# Patient Record
Sex: FEMALE | Race: WHITE | Employment: OTHER | URBAN - METROPOLITAN AREA
[De-identification: names, ages, dates, MRNs, and addresses within clinical notes are randomized per-mention and may not be internally consistent; named-entity substitution may affect disease eponyms.]

---

## 2017-02-07 ENCOUNTER — OFFICE VISIT (OUTPATIENT)
Dept: FAMILY MEDICINE CLINIC | Age: 57
End: 2017-02-07

## 2017-02-07 VITALS
SYSTOLIC BLOOD PRESSURE: 133 MMHG | BODY MASS INDEX: 31.7 KG/M2 | RESPIRATION RATE: 20 BRPM | HEIGHT: 65 IN | HEART RATE: 92 BPM | TEMPERATURE: 97.4 F | DIASTOLIC BLOOD PRESSURE: 89 MMHG | WEIGHT: 190.25 LBS

## 2017-02-07 DIAGNOSIS — E78.2 MIXED HYPERLIPIDEMIA: ICD-10-CM

## 2017-02-07 DIAGNOSIS — Z51.81 MEDICATION MONITORING ENCOUNTER: ICD-10-CM

## 2017-02-07 DIAGNOSIS — R73.03 PREDIABETES: ICD-10-CM

## 2017-02-07 DIAGNOSIS — I10 ESSENTIAL HYPERTENSION: ICD-10-CM

## 2017-02-07 DIAGNOSIS — J02.9 SORE THROAT: ICD-10-CM

## 2017-02-07 DIAGNOSIS — R73.01 ELEVATED FASTING BLOOD SUGAR: Primary | ICD-10-CM

## 2017-02-07 LAB
GLUCOSE POC: 129 MG/DL
HBA1C MFR BLD HPLC: 5.8 %
S PYO AG THROAT QL: NEGATIVE
VALID INTERNAL CONTROL?: YES

## 2017-02-07 NOTE — MR AVS SNAPSHOT
Visit Information Date & Time Provider Department Dept. Phone Encounter #  
 2/7/2017  1:15 PM Hammad Ortiz MD [facility] [phone] [id number] Follow-up Instructions Return in about 3 months (around 5/7/2017). Upcoming Health Maintenance Date Due FOBT Q 1 YEAR AGE 50-75 11/14/2015 PAP AKA CERVICAL CYTOLOGY 11/18/2017 BREAST CANCER SCRN MAMMOGRAM 10/18/2018 DTaP/Tdap/Td series (2 - Td) 3/15/2026 Allergies as of 2/7/2017  Review Complete On: 2/7/2017 By: Hammad Ortiz MD  
 No Known Allergies Current Immunizations  Reviewed on 3/15/2016 Name Date  
 TD Vaccine 7/19/2010 Tdap 3/15/2016 Not reviewed this visit You Were Diagnosed With   
  
 Codes Comments Elevated fasting blood sugar    -  Primary ICD-10-CM: R73.01 
ICD-9-CM: 790.21 Prediabetes     ICD-10-CM: R73.03 
ICD-9-CM: 790.29 Essential hypertension     ICD-10-CM: I10 
ICD-9-CM: 401.9 Mixed hyperlipidemia     ICD-10-CM: E78.2 ICD-9-CM: 272.2 Sore throat     ICD-10-CM: J02.9 ICD-9-CM: 363 Medication monitoring encounter     ICD-10-CM: Z51.81 
ICD-9-CM: V58.83 Vitals BP Pulse Temp Resp Height(growth percentile) Weight(growth percentile) 133/89 (BP 1 Location: Right arm, BP Patient Position: Sitting) 92 97.4 °F (36.3 °C) (Oral) 20 5' 4.5\" (1.638 m) 190 lb 4 oz (86.3 kg) BMI OB Status Smoking Status 32.15 kg/m2 Hysterectomy Former Smoker BMI and BSA Data Body Mass Index Body Surface Area  
 32.15 kg/m 2 1.98 m 2 Preferred Pharmacy Pharmacy Name Phone [id number], [address] [place] [address] Your Updated Medication List  
  
   
This list is accurate as of: 2/7/17  1:51 PM.  Always use your most recent med list.  
  
  
  
  
 amitriptyline 10 mg tablet Commonly known as:  ELAVIL TAKE 1 TABLET NIGHTLY  
  
 atorvastatin 40 mg tablet Commonly known as:  LIPITOR  
TAKE 1 TABLET DAILY CeleBREX 100 mg capsule Generic drug:  celecoxib Take  by mouth daily. As needed FISH OIL 1,000 mg Cap Generic drug:  omega-3 fatty acids-vitamin e Take 1 Cap by mouth daily. 1200mg  
  
 hydroCHLOROthiazide 25 mg tablet Commonly known as:  HYDRODIURIL  
TAKE 1 TABLET DAILY ZyrTEC 10 mg tablet Generic drug:  cetirizine Take  by mouth daily. We Performed the Following AMB POC GLUCOSE, QUANTITATIVE, BLOOD [96414 CPT(R)] AMB POC HEMOGLOBIN A1C [81307 CPT(R)] AMB POC RAPID STREP A [24377 CPT(R)] Follow-up Instructions Return in about 3 months (around 5/7/2017). To-Do List   
 05/07/2017 Lab:  CBC WITH AUTOMATED DIFF   
  
 05/07/2017 Lab:  HEMOGLOBIN A1C W/O EAG   
  
 05/07/2017 Lab:  LIPID PANEL   
  
 05/07/2017 Lab:  METABOLIC PANEL, COMPREHENSIVE   
  
 05/07/2017 Lab:  TSH 3RD GENERATION Patient Instructions Please contact our office if you have any questions about your visit today. Introducing [facility]! Dear Mahesh Stewart: Thank you for requesting a Trada account. Our records indicate that you already have an active Trada account. You can access your account anytime at [email or web address] Story of [email or web address] Did you know that you can access your hospital and ER discharge instructions at any time in Trada? You can also review all of your test results from your hospital stay or ER visit. Additional Information If you have questions, please visit the Frequently Asked Questions section of the Trada website at [email or web address] Remember, Trada is NOT to be used for urgent needs. For medical emergencies, dial 911. Now available from your iPhone and Android! Please provide this summary of care documentation to your next provider. Your primary care clinician is listed as GAURI SMITH. If you have any questions after today's visit, please call [phone].

## 2017-02-07 NOTE — PROGRESS NOTES
HISTORY OF PRESENT ILLNESS  Matt Martínez is a 49 y.o. female. Chief Complaint   Patient presents with    Cholesterol Problem chronic problem, stable      high chol    Hypertension chronic problem, stable     Allergic Rhinitis    Arthritis     Prediabetes chronic problem, stable no meds on diet for this  complains of sore throat, no f/c worried about strep  HPI  Past Medical History   Diagnosis Date    Allergic rhinitis     Arthritis     Hyperlipidemia     Hypertension     IBS (irritable bowel syndrome)     Migraine      Current Outpatient Prescriptions   Medication Sig Dispense Refill    amitriptyline (ELAVIL) 10 mg tablet TAKE 1 TABLET NIGHTLY 90 Tab 3    atorvastatin (LIPITOR) 40 mg tablet TAKE 1 TABLET DAILY 90 Tab 2    hydrochlorothiazide (HYDRODIURIL) 25 mg tablet TAKE 1 TABLET DAILY 90 Tab 2    cetirizine (ZYRTEC) 10 mg tablet Take  by mouth daily.  celecoxib (CELEBREX) 100 mg capsule Take  by mouth daily. As needed      omega-3 fatty acids-vitamin e (FISH OIL) 1,000 mg Cap Take 1 Cap by mouth daily. 1200mg       No Known Allergies    Review of Systems   Constitutional: Negative for chills and fever. HENT: Positive for sore throat. Neurological: Negative for dizziness and headaches. Visit Vitals    /89 (BP 1 Location: Right arm, BP Patient Position: Sitting)    Pulse 92    Temp 97.4 °F (36.3 °C) (Oral)    Resp 20    Ht 5' 4.5\" (1.638 m)    Wt 190 lb 4 oz (86.3 kg)    BMI 32.15 kg/m2     Physical Exam Nursing note and vitals reviewed. Constitutional: She is oriented to person, place, and time. She appears well-developed and well-nourished. No distress. HENT:   Mouth/Throat: Oropharynx is mildly injected, small exudate right and moist.   Neck: No JVD present. No thyromegaly present. Cardiovascular: Normal rate, regular rhythm and normal heart sounds. Pulmonary/Chest: Effort normal and breath sounds normal. No respiratory distress. She has no wheezes.  She has no rales.   Musculoskeletal: She exhibits no edema. Lymphadenopathy:     She has no cervical adenopathy. Neurological: She is alert and oriented to person, place, and time. Coordination normal.   Psychiatric: She has a normal mood and affect. Her behavior is normal.     ,  Results for orders placed or performed in visit on 02/07/17   AMB POC HEMOGLOBIN A1C   Result Value Ref Range    Hemoglobin A1c (POC) 5.8 %   AMB POC GLUCOSE, QUANTITATIVE, BLOOD   Result Value Ref Range    Glucose  mg/dL   AMB POC RAPID STREP A   Result Value Ref Range    VALID INTERNAL CONTROL POC Yes     Group A Strep Ag Negative Negative       ASSESSMENT and PLAN    ICD-10-CM ICD-9-CM    1. Elevated fasting blood sugar Stable, continue current care. R73.01 790.21 AMB POC HEMOGLOBIN A1C      AMB POC GLUCOSE, QUANTITATIVE, BLOOD      METABOLIC PANEL, COMPREHENSIVE      CBC WITH AUTOMATED DIFF      TSH 3RD GENERATION      HEMOGLOBIN A1C W/O EAG   2. Prediabetes Stable, continue current care. X71.15 170.75 METABOLIC PANEL, COMPREHENSIVE      CBC WITH AUTOMATED DIFF      TSH 3RD GENERATION      HEMOGLOBIN A1C W/O EAG   3. Essential hypertension stable continue current medications  T82 242.1 METABOLIC PANEL, COMPREHENSIVE      CBC WITH AUTOMATED DIFF      TSH 3RD GENERATION      HEMOGLOBIN A1C W/O EAG   4. Mixed hyperlipidemia Check labs on follow up   V86.2 675.1 METABOLIC PANEL, COMPREHENSIVE      LIPID PANEL      CBC WITH AUTOMATED DIFF      TSH 3RD GENERATION      HEMOGLOBIN A1C W/O EAG   5. Sore throat J02.9 462    6.  Medication monitoring encounter G17.65 D78.96 METABOLIC PANEL, COMPREHENSIVE      LIPID PANEL      CBC WITH AUTOMATED DIFF      TSH 3RD GENERATION      HEMOGLOBIN A1C W/O EAG      AMB POC RAPID STREP A

## 2017-02-07 NOTE — PROGRESS NOTES
Chief Complaint   Patient presents with    Cholesterol Problem     high chol    Hypertension    Allergic Rhinitis    Arthritis       Health Maintenance reviewed     1. Have you been to the ER, urgent care clinic since your last visit? Hospitalized since your last visit? No    2. Have you seen or consulted any other health care providers outside of the [address] [place] [address] since your last visit? Include any pap smears or colon screening.  No

## 2017-02-17 RX ORDER — HYDROCHLOROTHIAZIDE 25 MG/1
TABLET ORAL
Qty: 90 TAB | Refills: 1 | Status: SHIPPED | OUTPATIENT
Start: 2017-02-17 | End: 2017-08-16 | Stop reason: SDUPTHER

## 2017-02-17 RX ORDER — ATORVASTATIN CALCIUM 40 MG/1
TABLET, FILM COATED ORAL
Qty: 90 TAB | Refills: 1 | Status: SHIPPED | OUTPATIENT
Start: 2017-02-17 | End: 2017-08-16 | Stop reason: SDUPTHER

## 2017-06-07 ENCOUNTER — HOSPITAL ENCOUNTER (OUTPATIENT)
Dept: LAB | Age: 57
Discharge: HOME | End: 2017-06-07
Payer: OTHER GOVERNMENT

## 2017-06-07 LAB
ALBUMIN SERPL BCP-MCNC: 3.8 G/DL (ref 3.4–5)
ALBUMIN/GLOB SERPL: 1.2 {RATIO} (ref 0.8–1.7)
ALP SERPL-CCNC: 113 U/L (ref 45–117)
ALT SERPL-CCNC: 30 U/L (ref 13–56)
ANION GAP BLD CALC-SCNC: 9 MMOL/L (ref 3–18)
AST SERPL W P-5'-P-CCNC: 15 U/L (ref 15–37)
BASOPHILS # BLD AUTO: 0 K/UL (ref 0–0.1)
BASOPHILS # BLD: 1 % (ref 0–2)
BILIRUB SERPL-MCNC: 0.6 MG/DL (ref 0.2–1)
BUN SERPL-MCNC: 15 MG/DL (ref 7–18)
BUN/CREAT SERPL: 24 (ref 12–20)
CALCIUM SERPL-MCNC: 9 MG/DL (ref 8.5–10.1)
CHLORIDE SERPL-SCNC: 101 MMOL/L (ref 100–108)
CHOLEST SERPL-MCNC: 160 MG/DL
CO2 SERPL-SCNC: 30 MMOL/L (ref 21–32)
CREAT SERPL-MCNC: 0.62 MG/DL (ref 0.6–1.3)
DIFFERENTIAL METHOD BLD: NORMAL
EOSINOPHIL # BLD: 0.1 K/UL (ref 0–0.4)
EOSINOPHIL NFR BLD: 2 % (ref 0–5)
ERYTHROCYTE [DISTWIDTH] IN BLOOD BY AUTOMATED COUNT: 13.2 % (ref 11.6–14.5)
GLOBULIN SER CALC-MCNC: 3.3 G/DL (ref 2–4)
GLUCOSE SERPL-MCNC: 103 MG/DL (ref 74–99)
HBA1C MFR BLD: 5.9 % (ref 4.2–5.6)
HCT VFR BLD AUTO: 39.1 % (ref 35–45)
HDLC SERPL-MCNC: 49 MG/DL (ref 40–60)
HDLC SERPL: 3.3 {RATIO} (ref 0–5)
HGB BLD-MCNC: 13.1 G/DL (ref 12–16)
LDLC SERPL CALC-MCNC: 79.8 MG/DL (ref 0–100)
LIPID PROFILE,FLP: ABNORMAL
LYMPHOCYTES # BLD AUTO: 33 % (ref 21–52)
LYMPHOCYTES # BLD: 2 K/UL (ref 0.9–3.6)
MCH RBC QN AUTO: 29.5 PG (ref 24–34)
MCHC RBC AUTO-ENTMCNC: 33.5 G/DL (ref 31–37)
MCV RBC AUTO: 88.1 FL (ref 74–97)
MONOCYTES # BLD: 0.4 K/UL (ref 0.05–1.2)
MONOCYTES NFR BLD AUTO: 7 % (ref 3–10)
NEUTS SEG # BLD: 3.5 K/UL (ref 1.8–8)
NEUTS SEG NFR BLD AUTO: 57 % (ref 40–73)
PLATELET # BLD AUTO: 310 K/UL (ref 135–420)
PMV BLD AUTO: 10.4 FL (ref 9.2–11.8)
POTASSIUM SERPL-SCNC: 4 MMOL/L (ref 3.5–5.5)
PROT SERPL-MCNC: 7.1 G/DL (ref 6.4–8.2)
RBC # BLD AUTO: 4.44 M/UL (ref 4.2–5.3)
SODIUM SERPL-SCNC: 140 MMOL/L (ref 136–145)
TRIGL SERPL-MCNC: 156 MG/DL (ref ?–150)
TSH SERPL DL<=0.05 MIU/L-ACNC: 1.52 UIU/ML (ref 0.36–3.74)
VLDLC SERPL CALC-MCNC: 31.2 MG/DL
WBC # BLD AUTO: 6.1 K/UL (ref 4.6–13.2)

## 2017-06-07 PROCEDURE — 84443 ASSAY THYROID STIM HORMONE: CPT | Performed by: FAMILY MEDICINE

## 2017-06-07 PROCEDURE — 80053 COMPREHEN METABOLIC PANEL: CPT | Performed by: FAMILY MEDICINE

## 2017-06-07 PROCEDURE — 83036 HEMOGLOBIN GLYCOSYLATED A1C: CPT | Performed by: FAMILY MEDICINE

## 2017-06-07 PROCEDURE — 85025 COMPLETE CBC W/AUTO DIFF WBC: CPT | Performed by: FAMILY MEDICINE

## 2017-06-07 PROCEDURE — 36415 COLL VENOUS BLD VENIPUNCTURE: CPT | Performed by: FAMILY MEDICINE

## 2017-06-07 PROCEDURE — 80061 LIPID PANEL: CPT | Performed by: FAMILY MEDICINE

## 2017-06-12 ENCOUNTER — OFFICE VISIT (OUTPATIENT)
Dept: FAMILY MEDICINE CLINIC | Age: 57
End: 2017-06-12

## 2017-06-12 ENCOUNTER — DOCUMENTATION ONLY (OUTPATIENT)
Dept: FAMILY MEDICINE CLINIC | Age: 57
End: 2017-06-12

## 2017-06-12 VITALS — BODY MASS INDEX: 31.65 KG/M2 | WEIGHT: 190 LBS | HEIGHT: 65 IN

## 2017-06-12 DIAGNOSIS — L29.9 PRURITIC DERMATITIS: ICD-10-CM

## 2017-06-12 DIAGNOSIS — L23.7 POISON IVY DERMATITIS: Primary | ICD-10-CM

## 2017-06-12 RX ORDER — HYDROXYZINE PAMOATE 25 MG/1
25 CAPSULE ORAL
Qty: 30 CAP | Refills: 0 | Status: SHIPPED | OUTPATIENT
Start: 2017-06-12 | End: 2017-06-26

## 2017-06-12 RX ORDER — METHYLPREDNISOLONE 4 MG/1
TABLET ORAL
Qty: 1 DOSE PACK | Refills: 0 | Status: SHIPPED | OUTPATIENT
Start: 2017-06-12 | End: 2017-06-29 | Stop reason: ALTCHOICE

## 2017-06-12 RX ORDER — TRIAMCINOLONE ACETONIDE 40 MG/ML
60 INJECTION, SUSPENSION INTRA-ARTICULAR; INTRAMUSCULAR ONCE
Qty: 1 ML | Refills: 0
Start: 2017-06-12 | End: 2017-06-12

## 2017-06-12 RX ORDER — HYDROXYZINE PAMOATE 25 MG/1
25 CAPSULE ORAL
Qty: 30 CAP | Refills: 0 | Status: SHIPPED | OUTPATIENT
Start: 2017-06-12 | End: 2017-06-12 | Stop reason: SDUPTHER

## 2017-06-12 RX ORDER — METHYLPREDNISOLONE 4 MG/1
TABLET ORAL
Qty: 1 DOSE PACK | Refills: 0 | Status: SHIPPED | OUTPATIENT
Start: 2017-06-12 | End: 2017-06-12 | Stop reason: SDUPTHER

## 2017-06-12 NOTE — PATIENT INSTRUCTIONS
Please contact our office if you have any questions about your visit today. Poison [place], [place], and [place]: Care Instructions  Your Care Instructions    Poison ivy, poison oak, and poison sumac are plants that can cause a skin rash upon contact. The red, itchy rash often shows up in lines or streaks and may cause fluid-filled blisters or large, raised hives. The rash is caused by an allergic reaction to an oil in poison ivy, oak, and sumac. The rash may occur when you touch the plant or when you touch clothing, pet fur, sporting gear, gardening tools, or other objects that have come in contact with one of these plants. You cannot catch or spread the rash, even if you touch it or the blister fluid, because the plant oil will already have been absorbed or washed off the skin. The rash may seem to be spreading, but either it is still developing from earlier contact or you have touched something that still has the plant oil on it. Follow-up care is a key part of your treatment and safety. Be sure to make and go to all appointments, and call your doctor if you are having problems. It's also a good idea to know your test results and keep a list of the medicines you take. How can you care for yourself at home? · If your doctor prescribed a cream, use it as directed. If your doctor prescribed medicine, take it exactly as prescribed. Call your doctor if you think you are having a problem with your medicine. · Use cold, wet cloths to reduce itching. · Keep cool, and stay out of the sun. · Leave the rash open to the air. · Wash all clothing or other things that may have come in contact with the plant oil. · Avoid most lotions and ointments until the rash heals. Calamine lotion may help relieve symptoms of a plant rash. Use it 3 or 4 times a day.   To prevent poison ivy exposure  If you know that you will be near poison ivy, oak, or sumac, you can try these options:  · Use a product designed to help prevent plant oil from getting on the skin. These products, such as Ivy X Pre-Contact Skin Solution, come in lotions, sprays, or towelettes. You put the product on your skin right before you go outdoors. · If you did not use a preventive product and you have had contact with plant oil, clean it off your skin as soon as possible. Use a product such as Tecnu Original Outdoor Skin Cleanser. These products can also be used to clean plant oil from clothing or tools. When should you call for help? Call your doctor now or seek immediate medical care if:  · Your rash gets worse, and you start to feel bad and have a fever, a stiff neck, nausea, and vomiting. · You have signs of infection, such as:  ¨ Increased pain, swelling, warmth, or redness. ¨ Red streaks leading from the rash. ¨ Pus draining from the rash. ¨ A fever. Watch closely for changes in your health, and be sure to contact your doctor if:  · You have new blisters or bruises, or the rash spreads and looks like a sunburn. · The rash gets worse, or it comes back after nearly disappearing. · You think a medicine you are using is making your rash worse. · Your rash does not clear up after 1 to 2 weeks of home treatment. · You have joint aches or body aches with your rash. Where can you learn more? Go to http://edi-oneil.info/. Enter D705 in the search box to learn more about \"Poison [place], [place], and [place]: Care Instructions. \"  Current as of: October 13, 2016  Content Version: 11.2  © 5343-2913 [employer]. Care instructions adapted under license by [employer] (which disclaims liability or warranty for this information). If you have questions about a medical condition or this instruction, always ask your healthcare professional. [address] any warranty or liability for your use of this information.

## 2017-06-12 NOTE — PROGRESS NOTES
HISTORY OF PRESENT ILLNESS  Alistair Forman is a 49 y.o. female. Chief Complaint   Patient presents with    Poison Ivy/Poison Oak/Poison [place] Exposure     pt has poison ivy on arms, legs, body     Started 10 days ago rapidly spreading and progressing, has been using benadryl , calamine, otc helps some only temporarily  No f/c   HPI  Past Medical History:   Diagnosis Date    Allergic rhinitis     Arthritis     Hyperlipidemia     Hypertension     IBS (irritable bowel syndrome)     Migraine      Current Outpatient Prescriptions   Medication Sig Dispense Refill    hydroCHLOROthiazide (HYDRODIURIL) 25 mg tablet TAKE 1 TABLET DAILY 90 Tab 1    atorvastatin (LIPITOR) 40 mg tablet TAKE 1 TABLET DAILY 90 Tab 1    amitriptyline (ELAVIL) 10 mg tablet TAKE 1 TABLET NIGHTLY 90 Tab 3    cetirizine (ZYRTEC) 10 mg tablet Take  by mouth daily.  celecoxib (CELEBREX) 100 mg capsule Take  by mouth daily. As needed      omega-3 fatty acids-vitamin e (FISH OIL) 1,000 mg Cap Take 1 Cap by mouth daily. 1200mg       No Known Allergies    Review of Systems   Constitutional: Negative for chills and fever. Skin: Positive for itching and rash. Visit Vitals    Ht 5' 4.5\" (1.638 m)    Wt 190 lb (86.2 kg)    BMI 32.11 kg/m2       Physical Exam   Constitutional: She appears well-developed and well-nourished. She appears distressed. Cardiovascular: Normal rate, regular rhythm and normal heart sounds. Pulmonary/Chest: Effort normal and breath sounds normal. No respiratory distress. She has no wheezes. She has no rales. Musculoskeletal: She exhibits edema. Skin: Rash noted. There is erythema. Splotchy vesicles on erythematous base patches diffusely     Psychiatric: She has a normal mood and affect. Her behavior is normal.   Nursing note and vitals reviewed. ASSESSMENT and PLAN    ICD-10-CM ICD-9-CM    1.  Poison ivy dermatitis L23.7 692.6 TRIAMCINOLONE ACETONIDE INJ      triamcinolone acetonide (KENALOG) 40 mg/mL injection      hydrOXYzine pamoate (VISTARIL) 25 mg capsule      methylPREDNISolone (MEDROL DOSEPACK) 4 mg tablet      DISCONTINUED: methylPREDNISolone (MEDROL DOSEPACK) 4 mg tablet      DISCONTINUED: hydrOXYzine pamoate (VISTARIL) 25 mg capsule      DISCONTINUED: methylPREDNISolone (MEDROL DOSEPACK) 4 mg tablet   2.  Pruritic dermatitis L29.9 698.9 TRIAMCINOLONE ACETONIDE INJ      triamcinolone acetonide (KENALOG) 40 mg/mL injection      hydrOXYzine pamoate (VISTARIL) 25 mg capsule      methylPREDNISolone (MEDROL DOSEPACK) 4 mg tablet      DISCONTINUED: methylPREDNISolone (MEDROL DOSEPACK) 4 mg tablet      DISCONTINUED: hydrOXYzine pamoate (VISTARIL) 25 mg capsule      DISCONTINUED: methylPREDNISolone (MEDROL DOSEPACK) 4 mg tablet

## 2017-06-12 NOTE — PROGRESS NOTES
Chief Complaint   Patient presents with    Poison Ivy/Poison Oak/Poison [place] Exposure     pt has poison ivy on arms, legs, body     Health Maintenance Due   Topic Date Due    FOBT Q 1 YEAR AGE 50-75  11/14/2015     Kenalog 60 mg given IM in left gluteus, pt observed for 15 minutes, no reaction noted.

## 2017-06-12 NOTE — MR AVS SNAPSHOT
Visit Information Date & Time Provider Department Dept. Phone Encounter #  
 6/12/2017  1:15 PM Jessica Yu MD [address] [id number] Your Appointments 6/29/2017  1:00 PM  
Follow Up with Jessica Yu MD  
[facility] (--) Appt Note: follow up 3-4 months; r/s from 6/1  
 [place] [id number] [address] [address] [phone]  
  
   
 [place] [id number] [address] Upcoming Health Maintenance Date Due FOBT Q 1 YEAR AGE 50-75 11/14/2015 INFLUENZA AGE 9 TO ADULT 8/1/2017 PAP AKA CERVICAL CYTOLOGY 11/18/2017 BREAST CANCER SCRN MAMMOGRAM 10/18/2018 DTaP/Tdap/Td series (2 - Td) 3/15/2026 Allergies as of 6/12/2017  Review Complete On: 6/12/2017 By: Jessica Yu MD  
 No Known Allergies Current Immunizations  Reviewed on 3/15/2016 Name Date  
 TD Vaccine 7/19/2010 Tdap 3/15/2016 Not reviewed this visit You Were Diagnosed With   
  
 Codes Comments Poison ivy dermatitis    -  Primary ICD-10-CM: L23.7 ICD-9-CM: 692.6 Pruritic dermatitis     ICD-10-CM: L29.9 ICD-9-CM: 698.9 Vitals Height(growth percentile) Weight(growth percentile) BMI OB Status Smoking Status 5' 4.5\" (1.638 m) 190 lb (86.2 kg) 32.11 kg/m2 Hysterectomy Former Smoker BMI and BSA Data Body Mass Index Body Surface Area  
 32.11 kg/m 2 1.98 m 2 Preferred Pharmacy Pharmacy Name Phone [facility] [phone] Your Updated Medication List  
  
   
This list is accurate as of: 6/12/17  1:52 PM.  Always use your most recent med list.  
  
  
  
  
 amitriptyline 10 mg tablet Commonly known as:  ELAVIL TAKE 1 TABLET NIGHTLY  
  
 atorvastatin 40 mg tablet Commonly known as:  LIPITOR  
TAKE 1 TABLET DAILY CeleBREX 100 mg capsule Generic drug:  celecoxib Take  by mouth daily. As needed FISH OIL 1,000 mg Cap Generic drug:  omega-3 fatty acids-vitamin e Take 1 Cap by mouth daily. 1200mg  
  
 hydroCHLOROthiazide 25 mg tablet Commonly known as:  HYDRODIURIL  
TAKE 1 TABLET DAILY  
  
 hydrOXYzine pamoate 25 mg capsule Commonly known as:  VISTARIL Take 1 Cap by mouth three (3) times daily as needed for Itching for up to 14 days. methylPREDNISolone 4 mg tablet Commonly known as:  Sadaf Holiday Take as directed per package instructions  
  
 triamcinolone acetonide 40 mg/mL injection Commonly known as:  KENALOG  
1.5 mL by IntraMUSCular route once for 1 dose. ZyrTEC 10 mg tablet Generic drug:  cetirizine Take  by mouth daily. Prescriptions Printed Refills  
 methylPREDNISolone (MEDROL DOSEPACK) 4 mg tablet 0 Sig: Take as directed per package instructions Class: Print Prescriptions Sent to Pharmacy Refills  
 hydrOXYzine pamoate (VISTARIL) 25 mg capsule 0 Sig: Take 1 Cap by mouth three (3) times daily as needed for Itching for up to 14 days. Class: Normal  
 Pharmacy: [address], [address] Ph #: [phone] Route: Oral  
  
We Performed the Following TRIAMCINOLONE ACETONIDE INJ [ [facility]] Patient Instructions Please contact our office if you have any questions about your visit today. Poison [place], [place], and [place]: Care Instructions Your Care Instructions Poison ivy, poison oak, and poison sumac are plants that can cause a skin rash upon contact. The red, itchy rash often shows up in lines or streaks and may cause fluid-filled blisters or large, raised hives. The rash is caused by an allergic reaction to an oil in poison ivy, oak, and sumac. The rash may occur when you touch the plant or when you touch clothing, pet fur, sporting gear, gardening tools, or other objects that have come in contact with one of these plants. You cannot catch or spread the rash, even if you touch it or the blister fluid, because the plant oil will already have been absorbed or washed off the skin. The rash may seem to be spreading, but either it is still developing from earlier contact or you have touched something that still has the plant oil on it. Follow-up care is a key part of your treatment and safety. Be sure to make and go to all appointments, and call your doctor if you are having problems. It's also a good idea to know your test results and keep a list of the medicines you take. How can you care for yourself at home? · If your doctor prescribed a cream, use it as directed. If your doctor prescribed medicine, take it exactly as prescribed. Call your doctor if you think you are having a problem with your medicine. · Use cold, wet cloths to reduce itching. · Keep cool, and stay out of the sun. · Leave the rash open to the air. · Wash all clothing or other things that may have come in contact with the plant oil. · Avoid most lotions and ointments until the rash heals. Calamine lotion may help relieve symptoms of a plant rash. Use it 3 or 4 times a day. To prevent poison ivy exposure If you know that you will be near poison ivy, oak, or sumac, you can try these options: · Use a product designed to help prevent plant oil from getting on the skin. These products, such as Ivy X Pre-Contact Skin Solution, come in lotions, sprays, or towelettes. You put the product on your skin right before you go outdoors. · If you did not use a preventive product and you have had contact with plant oil, clean it off your skin as soon as possible. Use a product such as Tecnu Original Outdoor Skin Cleanser. These products can also be used to clean plant oil from clothing or tools. When should you call for help?  
Call your doctor now or seek immediate medical care if: 
· Your rash gets worse, and you start to feel bad and have a fever, a stiff neck, nausea, and vomiting. · You have signs of infection, such as: 
¨ Increased pain, swelling, warmth, or redness. ¨ Red streaks leading from the rash. ¨ Pus draining from the rash. ¨ A fever. Watch closely for changes in your health, and be sure to contact your doctor if: 
· You have new blisters or bruises, or the rash spreads and looks like a sunburn. · The rash gets worse, or it comes back after nearly disappearing. · You think a medicine you are using is making your rash worse. · Your rash does not clear up after 1 to 2 weeks of home treatment. · You have joint aches or body aches with your rash. Where can you learn more? Go to http://edi-oneil.info/. Enter A241 in the search box to learn more about \"Poison [place], [place], and [place]: Care Instructions. \" Current as of: October 13, 2016 Content Version: 11.2 © 8100-4043 [employer]. Care instructions adapted under license by [employer] (which disclaims liability or warranty for this information). If you have questions about a medical condition or this instruction, always ask your healthcare professional. [place] [address] any warranty or liability for your use of this information. Introducing [facility]! Dear Alie Mendoza: Thank you for requesting a Lingua.ly account. Our records indicate that you already have an active [email or web address] account. You can access your account anytime at [email or web address] Did you know that you can access your hospital and ER discharge instructions at any time in [email or web address]? You can also review all of your test results from your hospital stay or ER visit. Additional Information If you have questions, please visit the Frequently Asked Questions section of the [email or web address] website at [email or web address] Remember, [email or web address].ly is NOT to be used for urgent needs. For medical emergencies, dial 911. Now available from your iPhone and Android! Please provide this summary of care documentation to your next provider. Your primary care clinician is listed as GAURI SMITH. If you have any questions after today's visit, please call [phone].

## 2017-06-29 ENCOUNTER — OFFICE VISIT (OUTPATIENT)
Dept: FAMILY MEDICINE CLINIC | Age: 57
End: 2017-06-29

## 2017-06-29 VITALS
WEIGHT: 184.5 LBS | BODY MASS INDEX: 30.74 KG/M2 | HEIGHT: 65 IN | SYSTOLIC BLOOD PRESSURE: 142 MMHG | HEART RATE: 70 BPM | RESPIRATION RATE: 16 BRPM | DIASTOLIC BLOOD PRESSURE: 86 MMHG | TEMPERATURE: 96.8 F

## 2017-06-29 DIAGNOSIS — Z12.11 SCREEN FOR COLON CANCER: ICD-10-CM

## 2017-06-29 DIAGNOSIS — R73.03 PREDIABETES: ICD-10-CM

## 2017-06-29 DIAGNOSIS — I10 ESSENTIAL HYPERTENSION: ICD-10-CM

## 2017-06-29 DIAGNOSIS — E78.2 MIXED HYPERLIPIDEMIA: Primary | ICD-10-CM

## 2017-06-29 NOTE — PROGRESS NOTES
HISTORY OF PRESENT ILLNESS  Marisol Galicia is a 56 y.o. female. Chief Complaint   Patient presents with    Blood sugar problem     Prediabetes ,chronic problem, stable no meds on diet for this     Hypertension Chronic problem, uncontrolled     Cholesterol Problem chronic problem, stable      high chol    Results     discuss lab results   complains of stress with caring for father elderly dx with mesothelioma, traveling to [place] every 3 weeks for his chemo and has not been eating well  blood pressure elevated today pt thinks from the stress Reports compliance with meds Asymptomatic, no headache or dizziness. HPI  Past Medical History:   Diagnosis Date    Allergic rhinitis     Arthritis     Hyperlipidemia     Hypertension     IBS (irritable bowel syndrome)     Migraine      Current Outpatient Prescriptions   Medication Sig Dispense Refill    hydroCHLOROthiazide (HYDRODIURIL) 25 mg tablet TAKE 1 TABLET DAILY 90 Tab 1    atorvastatin (LIPITOR) 40 mg tablet TAKE 1 TABLET DAILY 90 Tab 1    amitriptyline (ELAVIL) 10 mg tablet TAKE 1 TABLET NIGHTLY 90 Tab 3    cetirizine (ZYRTEC) 10 mg tablet Take  by mouth daily.  celecoxib (CELEBREX) 100 mg capsule Take  by mouth daily. As needed      omega-3 fatty acids-vitamin e (FISH OIL) 1,000 mg Cap Take 1 Cap by mouth daily. 1200mg       No Known Allergies    ROS Respiratory: Negative for shortness of breath. Cardiovascular: Negative for chest pain. Genitourinary: Negative for frequency. Neurological: Negative for dizziness and headaches. Visit Vitals    /89 (BP 1 Location: Right arm, BP Patient Position: Sitting)    Pulse 70    Temp 96.8 °F (36 °C) (Oral)    Resp 16    Ht 5' 4.5\" (1.638 m)    Wt 184 lb 8 oz (83.7 kg)    BMI 31.18 kg/m2       Physical Exam Nursing note and vitals reviewed. Constitutional: She is oriented to person, place, and time. She appears well-developed and well-nourished. No distress.    HENT:   Mouth/Throat: Oropharynx is clear and moist.   Neck: No JVD present. No thyromegaly present. Cardiovascular: Normal rate, regular rhythm and normal heart sounds. Pulmonary/Chest: Effort normal and breath sounds normal. No respiratory distress. She has no wheezes. She has no rales. Musculoskeletal: She exhibits no edema. Lymphadenopathy:     She has no cervical adenopathy. Neurological: She is alert and oriented to person, place, and time. Coordination normal.   Psychiatric: She has a normal mood and affect. Her behavior is normal.    Results for orders placed or performed during the hospital encounter of 06/07/17   CBC WITH AUTOMATED DIFF   Result Value Ref Range    WBC 6.1 4.6 - 13.2 K/uL    RBC 4.44 4.20 - 5.30 M/uL    HGB 13.1 12.0 - 16.0 g/dL    HCT 39.1 35.0 - 45.0 %    MCV 88.1 74.0 - 97.0 FL    MCH 29.5 24.0 - 34.0 PG    MCHC 33.5 31.0 - 37.0 g/dL    RDW 13.2 11.6 - 14.5 %    PLATELET 832 209 - 199 K/uL    MPV 10.4 9.2 - 11.8 FL    NEUTROPHILS 57 40 - 73 %    LYMPHOCYTES 33 21 - 52 %    MONOCYTES 7 3 - 10 %    EOSINOPHILS 2 0 - 5 %    BASOPHILS 1 0 - 2 %    ABS. NEUTROPHILS 3.5 1.8 - 8.0 K/UL    ABS. LYMPHOCYTES 2.0 0.9 - 3.6 K/UL    ABS. MONOCYTES 0.4 0.05 - 1.2 K/UL    ABS. EOSINOPHILS 0.1 0.0 - 0.4 K/UL    ABS.  BASOPHILS 0.0 0.0 - 0.1 K/UL    DF AUTOMATED     LIPID PANEL   Result Value Ref Range    LIPID PROFILE          Cholesterol, total 160 <200 MG/DL    Triglyceride 156 (H) <150 MG/DL    HDL Cholesterol 49 40 - 60 MG/DL    LDL, calculated 79.8 0 - 100 MG/DL    VLDL, calculated 31.2 MG/DL    CHOL/HDL Ratio 3.3 0 - 5.0     METABOLIC PANEL, COMPREHENSIVE   Result Value Ref Range    Sodium 140 136 - 145 mmol/L    Potassium 4.0 3.5 - 5.5 mmol/L    Chloride 101 100 - 108 mmol/L    CO2 30 21 - 32 mmol/L    Anion gap 9 3.0 - 18 mmol/L    Glucose 103 (H) 74 - 99 mg/dL    BUN 15 7.0 - 18 MG/DL    Creatinine 0.62 0.6 - 1.3 MG/DL    BUN/Creatinine ratio 24 (H) 12 - 20      GFR est AA >60 >60 ml/min/1.73m2    GFR est non-AA >60 >60 ml/min/1.73m2    Calcium 9.0 8.5 - 10.1 MG/DL    Bilirubin, total 0.6 0.2 - 1.0 MG/DL    ALT (SGPT) 30 13 - 56 U/L    AST (SGOT) 15 15 - 37 U/L    Alk. phosphatase 113 45 - 117 U/L    Protein, total 7.1 6.4 - 8.2 g/dL    Albumin 3.8 3.4 - 5.0 g/dL    Globulin 3.3 2.0 - 4.0 g/dL    A-G Ratio 1.2 0.8 - 1.7     TSH 3RD GENERATION   Result Value Ref Range    TSH 1.52 0.36 - 3.74 uIU/mL   HEMOGLOBIN A1C W/O EAG   Result Value Ref Range    Hemoglobin A1c 5.9 (H) 4.2 - 5.6 %       ASSESSMENT and PLAN    ICD-10-CM ICD-9-CM    1. Mixed hyperlipidemia improved significantly  E78.2 272.2    2. Prediabetes Stable, continue current care. R73.03 790.29    3. Essential hypertension uncontrolled today secondary to stress. Patient is asked to monitor BP at home or work, several times per month and return with written values at next office visit. I10 401.9    4. Screen for colon cancer Z12.11 V76.51 OCCULT BLOOD, IMMUNOASSAY (FIT)   Follow-up Disposition:  Return in about 3 months (around 9/29/2017).

## 2017-06-29 NOTE — MR AVS SNAPSHOT
Visit Information Date & Time Provider Department Dept. Phone Encounter #  
 6/29/2017  1:00 PM Frandy Hernandez, [address] [phone] [id number] Follow-up Instructions Return in about 3 months (around 9/29/2017). Upcoming Health Maintenance Date Due FOBT Q 1 YEAR AGE 50-75 11/14/2015 INFLUENZA AGE 9 TO ADULT 8/1/2017 PAP AKA CERVICAL CYTOLOGY 11/18/2017 BREAST CANCER SCRN MAMMOGRAM 10/18/2018 DTaP/Tdap/Td series (2 - Td) 3/15/2026 Allergies as of 6/29/2017  Review Complete On: 6/29/2017 By: Frandy Hernandez MD  
 No Known Allergies Current Immunizations  Reviewed on 3/15/2016 Name Date  
 TD Vaccine 7/19/2010 Tdap 3/15/2016 Not reviewed this visit You Were Diagnosed With   
  
 Codes Comments Mixed hyperlipidemia    -  Primary ICD-10-CM: L69.0 ICD-9-CM: 272.2 Prediabetes     ICD-10-CM: R73.03 
ICD-9-CM: 790.29 Essential hypertension     ICD-10-CM: I10 
ICD-9-CM: 401.9 Screen for colon cancer     ICD-10-CM: Z12.11 ICD-9-CM: V76.51 Vitals BP Pulse Temp Resp Height(growth percentile) Weight(growth percentile) 142/86 (BP 1 Location: Left arm, BP Patient Position: Sitting) 70 96.8 °F (36 °C) (Oral) 16 5' 4.5\" (1.638 m) 184 lb 8 oz (83.7 kg) BMI OB Status Smoking Status 31.18 kg/m2 Hysterectomy Former Smoker Vitals History BMI and BSA Data Body Mass Index Body Surface Area  
 31.18 kg/m 2 1.95 m 2 Preferred Pharmacy Pharmacy Name Phone [facility] [address] [place] [address] Rd [phone] Your Updated Medication List  
  
   
This list is accurate as of: 6/29/17  1:36 PM.  Always use your most recent med list.  
  
  
  
  
 amitriptyline 10 mg tablet Commonly known as:  ELAVIL TAKE 1 TABLET NIGHTLY  
  
 atorvastatin 40 mg tablet Commonly known as:  LIPITOR  
TAKE 1 TABLET DAILY CeleBREX 100 mg capsule Generic drug:  celecoxib Take  by mouth daily. As needed FISH OIL 1,000 mg Cap Generic drug:  omega-3 fatty acids-vitamin e Take 1 Cap by mouth daily. 1200mg  
  
 hydroCHLOROthiazide 25 mg tablet Commonly known as:  HYDRODIURIL  
TAKE 1 TABLET DAILY ZyrTEC 10 mg tablet Generic drug:  cetirizine Take  by mouth daily. Follow-up Instructions Return in about 3 months (around 9/29/2017). To-Do List   
 06/29/2017 Lab:  OCCULT BLOOD, IMMUNOASSAY (FIT) Patient Instructions Please contact our office if you have any questions about your visit today. Introducing [facility] & HEALTH SERVICES! Dear Calvin Dubon: Thank you for requesting a [employer] account. Our records indicate that you already have an active [employer] account. You can access your account anytime at [email or web address] Did you know that you can access your hospital and ER discharge instructions at any time in [employer]? You can also review all of your test results from your hospital stay or ER visit. Additional Information If you have questions, please visit the Frequently Asked Questions section of the [employer] website at [email or web address] Remember, [employer] is NOT to be used for urgent needs. For medical emergencies, dial 911. Now available from your iPhone and Android! Please provide this summary of care documentation to your next provider. Your primary care clinician is listed as GAURI SMITH. If you have any questions after today's visit, please call [phone].

## 2017-08-16 RX ORDER — ATORVASTATIN CALCIUM 40 MG/1
TABLET, FILM COATED ORAL
Qty: 90 TAB | Refills: 0 | Status: SHIPPED | OUTPATIENT
Start: 2017-08-16 | End: 2017-11-14 | Stop reason: SDUPTHER

## 2017-08-16 RX ORDER — HYDROCHLOROTHIAZIDE 25 MG/1
TABLET ORAL
Qty: 90 TAB | Refills: 0 | Status: SHIPPED | OUTPATIENT
Start: 2017-08-16 | End: 2017-11-14 | Stop reason: SDUPTHER

## 2017-09-13 NOTE — PROGRESS NOTES
"Subjective:       Patient ID: Desirae No is a 70 y.o. female.    Chief Complaint: ER Follow-up    HPI   ER Follow-up  71yo female presents today for follow-up after an ER visit yesterday to [facility] for hypotension. She notes a low reading at home and states she went to the ER for assessment. BP levels were found to have improved up to 126/66. No lab assessment was performed and she was discharged home. She has been followed by her Urologist (Dr. Le) with recent issues of UTI. She has a suprapubic catheter in place. She notes that her Urologist has advised she see an Immunologist based on concerns for her immune system. She has not made any arrangements. She is reporting some "hard knots" to the groin for the past 2 weeks. She has had a rash otherwise to the perineum.    Review of Systems   Constitutional: Positive for fatigue. Negative for appetite change and unexpected weight change.   Respiratory: Negative for chest tightness and shortness of breath.    Cardiovascular: Negative for chest pain, palpitations and leg swelling.   Gastrointestinal: Negative for abdominal pain, constipation and diarrhea.   Genitourinary: Negative for decreased urine volume, genital sores, pelvic pain, vaginal discharge and vaginal pain.   Musculoskeletal: Positive for arthralgias and back pain. Negative for myalgias.   Skin: Positive for rash. Negative for color change.   Neurological: Negative for dizziness, speech difficulty, weakness and headaches.   Psychiatric/Behavioral: Positive for dysphoric mood. Negative for sleep disturbance.       Objective:   /70   Pulse 100   Temp 98.1 °F (36.7 °C) (Temporal)   Resp 14   Ht 5' 8" (1.727 m)   Wt 94.6 kg (208 lb 7.1 oz)   LMP 04/24/1974   SpO2 98%   BMI 31.69 kg/m²   Physical Exam   Constitutional: She is oriented to person, place, and time. She appears well-developed. No distress.   Obese, non-toxic   HENT:   Head: Normocephalic and atraumatic.   Right Ear: External " Chief Complaint   Patient presents with    Blood sugar problem     prediabetes    Hypertension    Cholesterol Problem     high chol    Results     discuss lab results       1. Have you been to the ER, urgent care clinic since your last visit? Hospitalized since your last visit? No    2. Have you seen or consulted any other health care providers outside of the [facility] since your last visit? Include any pap smears or colon screening.  No ear normal.   Left Ear: External ear normal.   Nose: Nose normal.   Mouth/Throat: Oropharynx is clear and moist.   Eyes: Conjunctivae and EOM are normal. Pupils are equal, round, and reactive to light.   Neck: Normal range of motion. Neck supple.   Cardiovascular: Normal rate, regular rhythm and normal heart sounds.    Pulmonary/Chest: Effort normal and breath sounds normal.   Abdominal: Soft. Bowel sounds are normal.   Genitourinary:       There is no rash or tenderness on the right labia. There is no rash or tenderness on the left labia.   Genitourinary Comments: Suprapubic catheter in place   Musculoskeletal: She exhibits no edema.   Neurological: She is alert and oriented to person, place, and time.   Skin: Skin is warm and dry. Rash noted. She is not diaphoretic.        Psychiatric: She has a normal mood and affect.       Assessment:       1. Hypotension, unspecified hypotension type    2. Abscess of labia majora    3. Sebaceous cyst of labia    4. Dermatitis    5. Neurogenic bladder    6. Obesity (BMI 30-39.9)    7. Atherosclerosis of aorta    8. Suprapubic catheter        Plan:   Hypotension, unspecified hypotension type  Stable blood pressure levels without hypotension. Recommend continued home monitoring. Have advised maintaining hydration and nutrition.     Abscess of labia majora  Discussed DDX of abscess vs sebaceous cyst. Recommend warm compresses. Will provide Bactroban for topical use. See GYN for further evaluation.   -     mupirocin (BACTROBAN) 2 % ointment; Apply topically 3 (three) times daily.  Dispense: 30 g; Refill: 0  -     Ambulatory referral to Gynecology    Sebaceous cyst of labia  As per #2.  -     Ambulatory referral to Gynecology    Dermatitis  Recommend barrier cream application. Gentle skin cleansing is otherwise advised. No evidence of candidal intertrigo on exam.    Neurogenic bladder  Followed by Dr. Le. We have discussed the pending assessment per Immunology- she is asked to  clarify these recommendations with her Urologist. Given recurrent nature of UTI she may wish to consider Infectious Disease evaluation.    Obesity (BMI 30-39.9)  Weight loss efforts are encouraged through diet and lifestyle measures.    Atherosclerosis of aorta  BP and lipid control is advised.    Suprapubic catheter  Continue with current recommendations as per Urology.

## 2017-09-29 ENCOUNTER — OFFICE VISIT (OUTPATIENT)
Dept: FAMILY MEDICINE CLINIC | Age: 57
End: 2017-09-29

## 2017-09-29 VITALS
TEMPERATURE: 97.2 F | RESPIRATION RATE: 20 BRPM | HEART RATE: 75 BPM | WEIGHT: 188 LBS | HEIGHT: 65 IN | DIASTOLIC BLOOD PRESSURE: 80 MMHG | SYSTOLIC BLOOD PRESSURE: 136 MMHG | BODY MASS INDEX: 31.32 KG/M2

## 2017-09-29 DIAGNOSIS — E78.2 MIXED HYPERLIPIDEMIA: ICD-10-CM

## 2017-09-29 DIAGNOSIS — R73.03 PREDIABETES: Primary | ICD-10-CM

## 2017-09-29 DIAGNOSIS — Z23 ENCOUNTER FOR IMMUNIZATION: ICD-10-CM

## 2017-09-29 DIAGNOSIS — Z51.81 MEDICATION MONITORING ENCOUNTER: ICD-10-CM

## 2017-09-29 DIAGNOSIS — I10 ESSENTIAL HYPERTENSION: ICD-10-CM

## 2017-09-29 LAB
GLUCOSE POC: 146 MG/DL
HBA1C MFR BLD HPLC: 5.7 %

## 2017-09-29 NOTE — PROGRESS NOTES
Chief Complaint   Patient presents with    Cholesterol Problem     high chol    Blood sugar problem     prediabetes    Hypertension       Health Maintenance Due   Topic Date Due    FOBT Q 1 YEAR AGE 50-75  11/14/2015    INFLUENZA AGE 9 TO ADULT  08/01/2017    PAP AKA CERVICAL CYTOLOGY  11/18/2017       Health Maintenance reviewed     1. Have you been to the ER, urgent care clinic since your last visit? Hospitalized since your last visit? No    2. Have you seen or consulted any other health care providers outside of the [address] [place] [address] since your last visit? Include any pap smears or colon screening.  No

## 2017-09-29 NOTE — PROGRESS NOTES
HISTORY OF PRESENT ILLNESS  Daxa Schwab is a 56 y.o. female. Chief Complaint   Patient presents with    Cholesterol Problem chronic problem, stable        high chol    Blood sugar problem chronic problem, stable no meds on diet for this      prediabetes    Hypertension chronic problem, stable          HPI  Past Medical History:   Diagnosis Date    Allergic rhinitis     Arthritis     Hyperlipidemia     Hypertension     IBS (irritable bowel syndrome)     Migraine      Current Outpatient Prescriptions   Medication Sig Dispense Refill    atorvastatin (LIPITOR) 40 mg tablet TAKE 1 TABLET DAILY 90 Tab 0    hydroCHLOROthiazide (HYDRODIURIL) 25 mg tablet TAKE 1 TABLET DAILY 90 Tab 0    amitriptyline (ELAVIL) 10 mg tablet TAKE 1 TABLET NIGHTLY 90 Tab 3    cetirizine (ZYRTEC) 10 mg tablet Take  by mouth daily.  celecoxib (CELEBREX) 100 mg capsule Take  by mouth daily. As needed      omega-3 fatty acids-vitamin e (FISH OIL) 1,000 mg Cap Take 1 Cap by mouth daily. 1200mg       No Known Allergies    ROS Respiratory: Negative for shortness of breath. Cardiovascular: Negative for chest pain. Genitourinary: Negative for frequency. Neurological: Negative for dizziness and headaches. Visit Vitals    /80 (BP 1 Location: Left arm, BP Patient Position: Sitting)  Comment: manual    Pulse 75    Temp 97.2 °F (36.2 °C) (Oral)    Resp 20    Ht 5' 4.5\" (1.638 m)    Wt 188 lb (85.3 kg)    BMI 31.77 kg/m2       Physical Exam Nursing note and vitals reviewed. Constitutional: She is oriented to person, place, and time. She appears well-developed and well-nourished. No distress. HENT:   Mouth/Throat: Oropharynx is clear and moist.   Neck: No JVD present. No thyromegaly present. Cardiovascular: Normal rate, regular rhythm and normal heart sounds. Pulmonary/Chest: Effort normal and breath sounds normal. No respiratory distress. She has no wheezes. She has no rales.    Musculoskeletal: She exhibits no edema. Lymphadenopathy:     She has no cervical adenopathy. Neurological: She is alert and oriented to person, place, and time. Coordination normal.   Psychiatric: She has a normal mood and affect. Her behavior is normal.     Results for orders placed or performed during the hospital encounter of 06/07/17   CBC WITH AUTOMATED DIFF   Result Value Ref Range    WBC 6.1 4.6 - 13.2 K/uL    RBC 4.44 4.20 - 5.30 M/uL    HGB 13.1 12.0 - 16.0 g/dL    HCT 39.1 35.0 - 45.0 %    MCV 88.1 74.0 - 97.0 FL    MCH 29.5 24.0 - 34.0 PG    MCHC 33.5 31.0 - 37.0 g/dL    RDW 13.2 11.6 - 14.5 %    PLATELET 428 762 - 783 K/uL    MPV 10.4 9.2 - 11.8 FL    NEUTROPHILS 57 40 - 73 %    LYMPHOCYTES 33 21 - 52 %    MONOCYTES 7 3 - 10 %    EOSINOPHILS 2 0 - 5 %    BASOPHILS 1 0 - 2 %    ABS. NEUTROPHILS 3.5 1.8 - 8.0 K/UL    ABS. LYMPHOCYTES 2.0 0.9 - 3.6 K/UL    ABS. MONOCYTES 0.4 0.05 - 1.2 K/UL    ABS. EOSINOPHILS 0.1 0.0 - 0.4 K/UL    ABS. BASOPHILS 0.0 0.0 - 0.1 K/UL    DF AUTOMATED     LIPID PANEL   Result Value Ref Range    LIPID PROFILE          Cholesterol, total 160 <200 MG/DL    Triglyceride 156 (H) <150 MG/DL    HDL Cholesterol 49 40 - 60 MG/DL    LDL, calculated 79.8 0 - 100 MG/DL    VLDL, calculated 31.2 MG/DL    CHOL/HDL Ratio 3.3 0 - 5.0     METABOLIC PANEL, COMPREHENSIVE   Result Value Ref Range    Sodium 140 136 - 145 mmol/L    Potassium 4.0 3.5 - 5.5 mmol/L    Chloride 101 100 - 108 mmol/L    CO2 30 21 - 32 mmol/L    Anion gap 9 3.0 - 18 mmol/L    Glucose 103 (H) 74 - 99 mg/dL    BUN 15 7.0 - 18 MG/DL    Creatinine 0.62 0.6 - 1.3 MG/DL    BUN/Creatinine ratio 24 (H) 12 - 20      GFR est AA >60 >60 ml/min/1.73m2    GFR est non-AA >60 >60 ml/min/1.73m2    Calcium 9.0 8.5 - 10.1 MG/DL    Bilirubin, total 0.6 0.2 - 1.0 MG/DL    ALT (SGPT) 30 13 - 56 U/L    AST (SGOT) 15 15 - 37 U/L    Alk.  phosphatase 113 45 - 117 U/L    Protein, total 7.1 6.4 - 8.2 g/dL    Albumin 3.8 3.4 - 5.0 g/dL    Globulin 3.3 2.0 - 4.0 g/dL A-G Ratio 1.2 0.8 - 1.7     TSH 3RD GENERATION   Result Value Ref Range    TSH 1.52 0.36 - 3.74 uIU/mL   HEMOGLOBIN A1C W/O EAG   Result Value Ref Range    Hemoglobin A1c 5.9 (H) 4.2 - 5.6 %     Results for orders placed or performed in visit on 09/29/17   AMB POC HEMOGLOBIN A1C   Result Value Ref Range    Hemoglobin A1c (POC) 5.7 %   AMB POC GLUCOSE, QUANTITATIVE, BLOOD   Result Value Ref Range    Glucose  mg/dL       ASSESSMENT and PLAN    ICD-10-CM ICD-9-CM    1. Prediabetes improved chronic problem, stable no meds on diet for this  R73.03 790.29 AMB POC HEMOGLOBIN A1C      AMB POC GLUCOSE, QUANTITATIVE, BLOOD      METABOLIC PANEL, COMPREHENSIVE      HEMOGLOBIN A1C W/O EAG   2. Essential hypertension uncontrolled improved on loreto M41 191.5 METABOLIC PANEL, COMPREHENSIVE   3. Mixed hyperlipidemia stable continue current medications Check labs on follow up   E78.2 272.2 LIPID PANEL      METABOLIC PANEL, COMPREHENSIVE      HEMOGLOBIN A1C W/O EAG   4.  Medication monitoring encounter Z51.81 V58.83 LIPID PANEL      METABOLIC PANEL, COMPREHENSIVE   follow up 3 mo

## 2017-09-29 NOTE — MR AVS SNAPSHOT
Visit Information Date & Time Provider Department Dept. Phone Encounter #  
 9/29/2017  7:30 AM [facility] [phone] [id number] Follow-up Instructions Return in about 3 months (around 12/29/2017). Upcoming Health Maintenance Date Due FOBT Q 1 YEAR AGE 50-75 11/14/2015 PAP AKA CERVICAL CYTOLOGY 11/18/2017 BREAST CANCER SCRN MAMMOGRAM 10/18/2018 DTaP/Tdap/Td series (2 - Td) 3/15/2026 Allergies as of 9/29/2017  Review Complete On: 9/29/2017 By: Loreta Brewer MD  
 No Known Allergies Current Immunizations  Reviewed on 3/15/2016 Name Date  
 TD Vaccine 7/19/2010 Tdap 3/15/2016 Not reviewed this visit You Were Diagnosed With   
  
 Codes Comments Prediabetes    -  Primary ICD-10-CM: R73.03 
ICD-9-CM: 790.29 Essential hypertension     ICD-10-CM: I10 
ICD-9-CM: 401.9 Mixed hyperlipidemia     ICD-10-CM: E78.2 ICD-9-CM: 272.2 Medication monitoring encounter     ICD-10-CM: Z51.81 
ICD-9-CM: V58.83 Vitals BP Pulse Temp Resp Height(growth percentile) Weight(growth percentile) 136/80 (BP 1 Location: Left arm, BP Patient Position: Sitting) 75 97.2 °F (36.2 °C) (Oral) 20 5' 4.5\" (1.638 m) 188 lb (85.3 kg) BMI OB Status Smoking Status 31.77 kg/m2 Hysterectomy Former Smoker Vitals History BMI and BSA Data Body Mass Index Body Surface Area 31.77 kg/m 2 1.97 m 2 Preferred Pharmacy Pharmacy Name Phone [address] [facility] [phone] Your Updated Medication List  
  
   
This list is accurate as of: 9/29/17  8:17 AM.  Always use your most recent med list.  
  
  
  
  
 amitriptyline 10 mg tablet Commonly known as:  ELAVIL TAKE 1 TABLET NIGHTLY  
  
 atorvastatin 40 mg tablet Commonly known as:  LIPITOR  
TAKE 1 TABLET DAILY CeleBREX 100 mg capsule Generic drug:  celecoxib Take  by mouth daily. As needed FISH OIL 1,000 mg Cap Generic drug:  omega-3 fatty acids-vitamin e Take 1 Cap by mouth daily. 1200mg  
  
 hydroCHLOROthiazide 25 mg tablet Commonly known as:  HYDRODIURIL  
TAKE 1 TABLET DAILY ZyrTEC 10 mg tablet Generic drug:  cetirizine Take  by mouth daily. We Performed the Following AMB POC GLUCOSE, QUANTITATIVE, BLOOD [60371 CPT(R)] AMB POC HEMOGLOBIN A1C [41584 CPT(R)] Follow-up Instructions Return in about 3 months (around 12/29/2017). To-Do List   
 12/29/2017 Lab:  HEMOGLOBIN A1C W/O EAG   
  
 12/29/2017 Lab:  LIPID PANEL   
  
 12/29/2017 Lab:  METABOLIC PANEL, COMPREHENSIVE Patient Instructions Please contact our office if you have any questions about your visit today. Introducing [facility] & HEALTH SERVICES! Dear Brendan Brumfield: Thank you for requesting a [employer] account. Our records indicate that you already have an active [employer] account. You can access your account anytime at [email or web address] Did you know that you can access your hospital and ER discharge instructions at any time in [employer]? You can also review all of your test results from your hospital stay or ER visit. Additional Information If you have questions, please visit the Frequently Asked Questions section of the [employer] website at [email or web address] Remember, [employer] is NOT to be used for urgent needs. For medical emergencies, dial 911. Now available from your iPhone and Android! Please provide this summary of care documentation to your next provider. Your primary care clinician is listed as GAURI SMITH. If you have any questions after today's visit, please call [phone].

## 2017-09-29 NOTE — PROGRESS NOTES
Edmund Otero is a 56 y.o. female who presents for routine immunizations. She denies any symptoms , reactions or allergies that would exclude them from being immunized today. Risks and adverse reactions were discussed and the VIS was given to them. All questions were addressed. She was observed for 15 min post injection. There were no reactions observed.     Charlene Dunn LPN

## 2017-10-10 RX ORDER — AMITRIPTYLINE HYDROCHLORIDE 10 MG/1
TABLET, FILM COATED ORAL
Qty: 90 TAB | Refills: 3 | Status: SHIPPED | OUTPATIENT
Start: 2017-10-10 | End: 2018-10-05 | Stop reason: SDUPTHER

## 2017-10-24 ENCOUNTER — HOSPITAL ENCOUNTER (OUTPATIENT)
Dept: MAMMOGRAPHY | Age: 57
Discharge: HOME | End: 2017-10-24
Attending: FAMILY MEDICINE
Payer: OTHER GOVERNMENT

## 2017-10-24 DIAGNOSIS — Z12.31 VISIT FOR SCREENING MAMMOGRAM: ICD-10-CM

## 2017-10-24 PROCEDURE — 77067 SCR MAMMO BI INCL CAD: CPT

## 2017-11-14 RX ORDER — HYDROCHLOROTHIAZIDE 25 MG/1
TABLET ORAL
Qty: 90 TAB | Refills: 0 | Status: SHIPPED | OUTPATIENT
Start: 2017-11-14 | End: 2018-02-12 | Stop reason: SDUPTHER

## 2017-11-14 RX ORDER — ATORVASTATIN CALCIUM 40 MG/1
TABLET, FILM COATED ORAL
Qty: 90 TAB | Refills: 0 | Status: SHIPPED | OUTPATIENT
Start: 2017-11-14 | End: 2018-02-12 | Stop reason: SDUPTHER

## 2018-02-15 RX ORDER — ATORVASTATIN CALCIUM 40 MG/1
TABLET, FILM COATED ORAL
Qty: 90 TAB | Refills: 0 | Status: SHIPPED | COMMUNITY
Start: 2018-02-15 | End: 2018-05-17 | Stop reason: SDUPTHER

## 2018-02-15 RX ORDER — HYDROCHLOROTHIAZIDE 25 MG/1
TABLET ORAL
Qty: 90 TAB | Refills: 0 | Status: SHIPPED | COMMUNITY
Start: 2018-02-15 | End: 2018-05-17 | Stop reason: SDUPTHER

## 2018-04-18 ENCOUNTER — TELEPHONE (OUTPATIENT)
Dept: FAMILY MEDICINE CLINIC | Age: 58
End: 2018-04-18

## 2018-04-18 DIAGNOSIS — E78.49 OTHER HYPERLIPIDEMIA: ICD-10-CM

## 2018-04-18 DIAGNOSIS — I10 HYPERTENSION, UNSPECIFIED TYPE: Primary | ICD-10-CM

## 2018-04-18 DIAGNOSIS — R73.03 PREDIABETES: ICD-10-CM

## 2018-04-18 NOTE — TELEPHONE ENCOUNTER
----- Message from Chelsi Nolan sent at 2018  9:10 PM EDT -----  Regarding: Visit Follow-Up Question  Contact: [phone]  I could not see Dr. Stringer Part in January, so I scheduled a new appointment with you. However, my lab chit . I stopped in the office today and the  could not help me. Can you renew the chit? My appointment is next week, with you. Thank you.      Juan Oneil

## 2018-04-23 ENCOUNTER — HOSPITAL ENCOUNTER (OUTPATIENT)
Dept: LAB | Age: 58
Discharge: HOME | End: 2018-04-23
Payer: OTHER GOVERNMENT

## 2018-04-23 DIAGNOSIS — E78.49 OTHER HYPERLIPIDEMIA: ICD-10-CM

## 2018-04-23 DIAGNOSIS — R73.03 PREDIABETES: ICD-10-CM

## 2018-04-23 DIAGNOSIS — I10 HYPERTENSION, UNSPECIFIED TYPE: ICD-10-CM

## 2018-04-23 LAB
ALBUMIN SERPL-MCNC: 4 G/DL (ref 3.4–5)
ALBUMIN/GLOB SERPL: 1.3 {RATIO} (ref 0.8–1.7)
ALP SERPL-CCNC: 117 U/L (ref 45–117)
ALT SERPL-CCNC: 48 U/L (ref 13–56)
ANION GAP SERPL CALC-SCNC: 9 MMOL/L (ref 3–18)
AST SERPL-CCNC: 28 U/L (ref 15–37)
BILIRUB SERPL-MCNC: 1 MG/DL (ref 0.2–1)
BUN SERPL-MCNC: 11 MG/DL (ref 7–18)
BUN/CREAT SERPL: 17 (ref 12–20)
CALCIUM SERPL-MCNC: 9.3 MG/DL (ref 8.5–10.1)
CHLORIDE SERPL-SCNC: 102 MMOL/L (ref 100–108)
CHOLEST SERPL-MCNC: 192 MG/DL
CO2 SERPL-SCNC: 29 MMOL/L (ref 21–32)
CREAT SERPL-MCNC: 0.65 MG/DL (ref 0.6–1.3)
EST. AVERAGE GLUCOSE BLD GHB EST-MCNC: 134 MG/DL
GLOBULIN SER CALC-MCNC: 3.2 G/DL (ref 2–4)
GLUCOSE SERPL-MCNC: 108 MG/DL (ref 74–99)
HBA1C MFR BLD: 6.3 % (ref 4.2–5.6)
HDLC SERPL-MCNC: 50 MG/DL (ref 40–60)
HDLC SERPL: 3.8 {RATIO} (ref 0–5)
LDLC SERPL CALC-MCNC: 108.8 MG/DL (ref 0–100)
LIPID PROFILE,FLP: ABNORMAL
POTASSIUM SERPL-SCNC: 3.6 MMOL/L (ref 3.5–5.5)
PROT SERPL-MCNC: 7.2 G/DL (ref 6.4–8.2)
SODIUM SERPL-SCNC: 140 MMOL/L (ref 136–145)
TRIGL SERPL-MCNC: 166 MG/DL (ref ?–150)
VLDLC SERPL CALC-MCNC: 33.2 MG/DL

## 2018-04-23 PROCEDURE — 83036 HEMOGLOBIN GLYCOSYLATED A1C: CPT | Performed by: FAMILY MEDICINE

## 2018-04-23 PROCEDURE — 36415 COLL VENOUS BLD VENIPUNCTURE: CPT | Performed by: FAMILY MEDICINE

## 2018-04-23 PROCEDURE — 80053 COMPREHEN METABOLIC PANEL: CPT | Performed by: FAMILY MEDICINE

## 2018-04-23 PROCEDURE — 80061 LIPID PANEL: CPT | Performed by: FAMILY MEDICINE

## 2018-04-26 ENCOUNTER — OFFICE VISIT (OUTPATIENT)
Dept: FAMILY MEDICINE CLINIC | Age: 58
End: 2018-04-26

## 2018-04-26 VITALS
WEIGHT: 194 LBS | HEART RATE: 72 BPM | DIASTOLIC BLOOD PRESSURE: 94 MMHG | TEMPERATURE: 98 F | RESPIRATION RATE: 16 BRPM | SYSTOLIC BLOOD PRESSURE: 155 MMHG | HEIGHT: 65 IN | BODY MASS INDEX: 32.32 KG/M2 | OXYGEN SATURATION: 98 %

## 2018-04-26 DIAGNOSIS — E78.1 HYPERTRIGLYCERIDEMIA: ICD-10-CM

## 2018-04-26 DIAGNOSIS — R73.03 PREDIABETES: Primary | ICD-10-CM

## 2018-04-26 DIAGNOSIS — I10 ESSENTIAL HYPERTENSION: ICD-10-CM

## 2018-04-26 NOTE — PROGRESS NOTES
Sonja Cuellar 56 y.o. female   Chief Complaint   Patient presents with    Follow-up    Hypertension    Cholesterol Problem    Labs     completed on 4-23-18         1. Have you been to the ER, urgent care clinic since your last visit? Hospitalized since your last visit? No    2. Have you seen or consulted any other health care providers outside of the [facility] since your last visit? Include any pap smears or colon screening.  No

## 2018-04-26 NOTE — PROGRESS NOTES
HISTORY OF PRESENT ILLNESS  Eleazar Callaway is a 56 y.o. female. Chief Complaint   Patient presents with    Follow-up    Hypertension    Cholesterol Problem    Labs     completed on 4-23-18       HPI  Patient is here for a  follow up of Htn, and lipids. Patient had labs on 4-23-18. Labs reviewed in detail with patient     Patient does not need medication refills today. New concerns today: NONE      ROS  Review of Systems   Constitutional: Negative. HENT: Negative. Respiratory: Negative. Cardiovascular: Negative. All other systems reviewed and are negative. Physical Exam  Physical Exam   Nursing note and vitals reviewed. Constitutional: She is oriented to person, place, and time. She appears well-developed and well-nourished. HENT:   Head: Normocephalic and atraumatic. Right Ear: External ear normal.   Left Ear: External ear normal.   Nose: Nose normal.   Eyes: Conjunctivae and EOM are normal.   Neck: Normal range of motion. Neck supple. No JVD present. Carotid bruit is not present. No thyromegaly present. Cardiovascular: Normal rate, regular rhythm, normal heart sounds and intact distal pulses. Exam reveals no gallop and no friction rub. No murmur heard. Pulmonary/Chest: Effort normal and breath sounds normal. She has no wheezes. She has no rhonchi. She has no rales. Abdominal: Soft. Bowel sounds are normal.   Musculoskeletal: Normal range of motion. Neurological: She is alert and oriented to person, place, and time. Coordination normal.   Skin: Skin is warm and dry. Psychiatric: She has a normal mood and affect. Her behavior is normal. Judgment and thought content normal.     ASSESSMENT and PLAN  Diagnoses and all orders for this visit:    1. Prediabetes  -     METABOLIC PANEL, COMPREHENSIVE; Future  -     LIPID PANEL; Future  Increase exercise. 2. Hypertriglyceridemia  -     METABOLIC PANEL, COMPREHENSIVE; Future  -     LIPID PANEL; Future    3.  Essential hypertension  -     METABOLIC PANEL, COMPREHENSIVE; Future  -     LIPID PANEL; Future  Elevated today, recheck at f/u.           Follow-up Disposition: 3 months; sooner prn

## 2018-07-24 ENCOUNTER — HOSPITAL ENCOUNTER (OUTPATIENT)
Dept: LAB | Age: 58
Discharge: HOME | End: 2018-07-24
Payer: OTHER GOVERNMENT

## 2018-07-24 DIAGNOSIS — E78.1 HYPERTRIGLYCERIDEMIA: ICD-10-CM

## 2018-07-24 DIAGNOSIS — I10 ESSENTIAL HYPERTENSION: ICD-10-CM

## 2018-07-24 DIAGNOSIS — R73.03 PREDIABETES: ICD-10-CM

## 2018-07-24 LAB
ALBUMIN SERPL-MCNC: 3.9 G/DL (ref 3.4–5)
ALBUMIN/GLOB SERPL: 1.1 {RATIO} (ref 0.8–1.7)
ALP SERPL-CCNC: 113 U/L (ref 45–117)
ALT SERPL-CCNC: 43 U/L (ref 13–56)
ANION GAP SERPL CALC-SCNC: 6 MMOL/L (ref 3–18)
AST SERPL-CCNC: 21 U/L (ref 15–37)
BILIRUB SERPL-MCNC: 0.6 MG/DL (ref 0.2–1)
BUN SERPL-MCNC: 16 MG/DL (ref 7–18)
BUN/CREAT SERPL: 23 (ref 12–20)
CALCIUM SERPL-MCNC: 9.1 MG/DL (ref 8.5–10.1)
CHLORIDE SERPL-SCNC: 104 MMOL/L (ref 100–108)
CHOLEST SERPL-MCNC: 200 MG/DL
CO2 SERPL-SCNC: 31 MMOL/L (ref 21–32)
CREAT SERPL-MCNC: 0.69 MG/DL (ref 0.6–1.3)
GLOBULIN SER CALC-MCNC: 3.4 G/DL (ref 2–4)
GLUCOSE SERPL-MCNC: 116 MG/DL (ref 74–99)
HDLC SERPL-MCNC: 50 MG/DL (ref 40–60)
HDLC SERPL: 4 {RATIO} (ref 0–5)
LDLC SERPL CALC-MCNC: 100.8 MG/DL (ref 0–100)
LIPID PROFILE,FLP: ABNORMAL
POTASSIUM SERPL-SCNC: 4.4 MMOL/L (ref 3.5–5.5)
PROT SERPL-MCNC: 7.3 G/DL (ref 6.4–8.2)
SODIUM SERPL-SCNC: 141 MMOL/L (ref 136–145)
TRIGL SERPL-MCNC: 246 MG/DL (ref ?–150)
VLDLC SERPL CALC-MCNC: 49.2 MG/DL

## 2018-07-24 PROCEDURE — 36415 COLL VENOUS BLD VENIPUNCTURE: CPT | Performed by: FAMILY MEDICINE

## 2018-07-24 PROCEDURE — 80061 LIPID PANEL: CPT | Performed by: FAMILY MEDICINE

## 2018-07-24 PROCEDURE — 80053 COMPREHEN METABOLIC PANEL: CPT | Performed by: FAMILY MEDICINE

## 2018-08-15 ENCOUNTER — OFFICE VISIT (OUTPATIENT)
Dept: FAMILY MEDICINE CLINIC | Age: 58
End: 2018-08-15

## 2018-08-15 VITALS
BODY MASS INDEX: 31.99 KG/M2 | HEIGHT: 65 IN | SYSTOLIC BLOOD PRESSURE: 145 MMHG | RESPIRATION RATE: 16 BRPM | HEART RATE: 76 BPM | WEIGHT: 192 LBS | TEMPERATURE: 97.2 F | DIASTOLIC BLOOD PRESSURE: 86 MMHG

## 2018-08-15 DIAGNOSIS — M25.562 CHRONIC PAIN OF BOTH KNEES: Primary | ICD-10-CM

## 2018-08-15 DIAGNOSIS — R73.01 IFG (IMPAIRED FASTING GLUCOSE): ICD-10-CM

## 2018-08-15 DIAGNOSIS — M25.561 CHRONIC PAIN OF BOTH KNEES: Primary | ICD-10-CM

## 2018-08-15 DIAGNOSIS — E78.49 OTHER HYPERLIPIDEMIA: ICD-10-CM

## 2018-08-15 DIAGNOSIS — I10 ESSENTIAL HYPERTENSION: ICD-10-CM

## 2018-08-15 DIAGNOSIS — G89.29 CHRONIC PAIN OF BOTH KNEES: Primary | ICD-10-CM

## 2018-08-15 RX ORDER — FENOFIBRATE 145 MG/1
145 TABLET, COATED ORAL DAILY
Qty: 90 TAB | Refills: 3 | Status: SHIPPED | OUTPATIENT
Start: 2018-08-15 | End: 2019-07-23 | Stop reason: SDUPTHER

## 2018-08-15 NOTE — PROGRESS NOTES
Chief Complaint   Patient presents with    Follow-up    Cholesterol Problem    Blood sugar problem     IFG    Labs     Completed on 7-24-18    Hypertension     1. Have you been to the ER, urgent care clinic since your last visit? Hospitalized since your last visit? No    2. Have you seen or consulted any other health care providers outside of the [address] [place] [address] since your last visit? Include any pap smears or colon screening.  No

## 2018-08-15 NOTE — MR AVS SNAPSHOT
[facility] 
 
 
 [address] [id number] [address] [address] [phone] Patient: Mary Johnston MRN: [id number] :1960 Visit Information Date & Time Provider Department Dept. Phone Encounter #  
 8/15/2018 10:15 AM Francisco Verdin MD [address] [id number] Your Appointments 2018  8:30 AM  
New Patient with Jaimie Garcia MD  
[facility], [address] and [facility] [employer] ([place] [email or web address] [facility]) Appt Note: Francisco knee pain/ ref by Salima Groom / no xrays/Patient is aware that an insurance authorization is needed for this appointment and our fax number was given to them to get the authorization faxed over to our office/  *Advised the patient to come 30 minutes prior to their appointment with their picture I.D, Insurance cards and a list of their medications & dosage to the [facility] location  Juliane Sandoval, [facility] [address] [place] [address]  
[phone] [facility]  
  
    
 11/15/2018 10:30 AM  
Follow Up with Francisco Verdin MD  
[facility] (--) Appt Note: 3 months follow up [place] [id number] [place] [id number] [phone]  
  
   
 [place] [address] [place] [id number] Upcoming Health Maintenance Date Due Influenza Age 5 to Adult 2018 BREAST CANCER SCRN MAMMOGRAM 10/24/2019 COLONOSCOPY 2023 DTaP/Tdap/Td series (2 - Td) 3/15/2026 Allergies as of 8/15/2018  Review Complete On: 8/15/2018 By: Francisco Verdin MD  
 No Known Allergies Current Immunizations  Reviewed on 3/15/2016 Name Date Influenza Vaccine (Quad) PF 2017 TD Vaccine 2010 Tdap 3/15/2016 Not reviewed this visit You Were Diagnosed With   
  
 Codes Comments Chronic pain of both knees    -  Primary ICD-10-CM: M25.561, M25.562, G89.29 ICD-9-CM: 719.46, 338.29   
 Essential hypertension     ICD-10-CM: I10 
ICD-9-CM: 401.9 Other hyperlipidemia     ICD-10-CM: E78.4 ICD-9-CM: 272.4 IFG (impaired fasting glucose)     ICD-10-CM: R73.01 
ICD-9-CM: 790.21 Vitals BP Pulse Temp Resp Height(growth percentile) Weight(growth percentile) 145/86 76 97.2 °F (36.2 °C) (Oral) 16 5' 4.5\" (1.638 m) 192 lb (87.1 kg) BMI OB Status Smoking Status 32.45 kg/m2 Hysterectomy Former Smoker Vitals History BMI and BSA Data Body Mass Index Body Surface Area  
 32.45 kg/m 2 1.99 m 2 Preferred Pharmacy Pharmacy Name Phone Elle Cardoza, [facility] [phone] Your Updated Medication List  
  
   
This list is accurate as of 8/15/18  3:22 PM.  Always use your most recent med list.  
  
  
  
  
 amitriptyline 10 mg tablet Commonly known as:  ELAVIL TAKE 1 TABLET NIGHTLY CeleBREX 100 mg capsule Generic drug:  celecoxib Take  by mouth daily. As needed  
  
 fenofibrate nanocrystallized 145 mg tablet Commonly known as:  [employer] Take 1 Tab by mouth daily. FISH OIL 1,000 mg Cap Generic drug:  omega-3 fatty acids-vitamin e Take 1 Cap by mouth daily. 1200mg  
  
 hydroCHLOROthiazide 25 mg tablet Commonly known as:  HYDRODIURIL  
TAKE 1 TABLET DAILY ZyrTEC 10 mg tablet Generic drug:  cetirizine Take  by mouth daily. Prescriptions Sent to Pharmacy Refills  
 fenofibrate nanocrystallized (TRICOR) 145 mg tablet 3 Sig: Take 1 Tab by mouth daily. Class: Normal  
 Pharmacy: [facility] , [address] [place] [address] #: [phone] Route: Oral  
  
We Performed the Following REFERRAL TO ORTHOPEDICS [CZM052 Custom] To-Do List   
 08/15/2018 Lab:  HEMOGLOBIN A1C WITH EAG   
  
 08/15/2018 Lab:  LIPID PANEL   
  
 08/15/2018   Lab:  METABOLIC PANEL, COMPREHENSIVE   
  
  
 Referral Information Referral ID Referred By Referred To  
  
 [id number] Chris CHACKO [address]   
   [address], [address] [place], [address] Phone: [phone] Fax: [phone] Visits Status Start Date End Date 1 Authorized 8/15/18 8/15/19 If your referral has a status of pending review or denied, additional information will be sent to support the outcome of this decision. Patient Instructions Please contact our office if you have any questions about your visit today. Introducing [facility]! Dear Jasvir Koenig: Thank you for requesting a [employer] account. Our records indicate that you already have an active [employer] account. You can access your account anytime at [email or web address] Did you know that you can access your hospital and ER discharge instructions at any time in [employer]? You can also review all of your test results from your hospital stay or ER visit. Additional Information If you have questions, please visit the Frequently Asked Questions section of the [employer] website at [email or web address] Remember, [employer] is NOT to be used for urgent needs. For medical emergencies, dial 911. Now available from your iPhone and Android! Please provide this summary of care documentation to your next provider. Your primary care clinician is listed as Natan Milan. If you have any questions after today's visit, please call [phone].

## 2018-08-15 NOTE — PROGRESS NOTES
HISTORY OF PRESENT ILLNESS  Shanelle Baron is a 66 y.o. female. Chief Complaint   Patient presents with    Follow-up    Cholesterol Problem    Blood sugar problem     IFG    Labs     Completed on 7-24-18    Hypertension       HPI  Patient is here for a  follow up of Cholesterol, htn, and IFG. Patient had labs on 7-24-18. Labs reviewed in detail with patient     Patient does not need medication refills today. New concerns today: Patient reports having bilateral knee pain, with the pain worse in the right knee. It has been happening for 4-6 wks. She takes motrin occasionally. She has pain when she first wakes up as well as pain when she transitions to sitting or standing. After she is moving, she feels better. Pt notes that she did felt a pop in her r knee several times. Patient denies any injuries, or swelling. Review of Systems   Constitutional: Negative. HENT: Negative. Respiratory: Negative. Cardiovascular: Negative. Musculoskeletal: Positive for joint pain. All other systems reviewed and are negative. Physical Exam  Physical Exam   Nursing note and vitals reviewed. Constitutional: She is oriented to person, place, and time. She appears well-developed and well-nourished. HENT:   Head: Normocephalic and atraumatic. Right Ear: External ear normal.   Left Ear: External ear normal.   Nose: Nose normal.   Eyes: Conjunctivae and EOM are normal.   Neck: Normal range of motion. Neck supple. No JVD present. Carotid bruit is not present. No thyromegaly present. Cardiovascular: Normal rate, regular rhythm, normal heart sounds and intact distal pulses. Exam reveals no gallop and no friction rub. 3/6 murmur heard. Pulmonary/Chest: Effort normal and breath sounds normal. She has no wheezes. She has no rhonchi. She has no rales. Abdominal: Soft. Bowel sounds are normal.   Musculoskeletal: Normal range of motion.    Neurological: She is alert and oriented to person, place, and time. Coordination normal.   Skin: Skin is warm and dry. Psychiatric: She has a normal mood and affect. Her behavior is normal. Judgment and thought content normal.     ASSESSMENT and PLAN  Diagnoses and all orders for this visit:    1. Chronic pain of both knees  -     [address] [place] [facility]    2. Essential hypertension  -     METABOLIC PANEL, COMPREHENSIVE; Future  -     LIPID PANEL; Future  Slightly elevated. Cont to monitor; adjust meds if no improvement. 3. Other hyperlipidemia  - trial:     fenofibrate nanocrystallized (TRICOR) 145 mg tablet; Take 1 Tab by mouth daily.  -     METABOLIC PANEL, COMPREHENSIVE; Future  -     LIPID PANEL; Future  D/c statin. 4. IFG (impaired fasting glucose)  -     METABOLIC PANEL, COMPREHENSIVE; Future  -     LIPID PANEL; Future  -     HEMOGLOBIN A1C WITH EAG;  Future      Follow-up Disposition: 3 months; sooner prn

## 2018-10-12 RX ORDER — AMITRIPTYLINE HYDROCHLORIDE 10 MG/1
TABLET, FILM COATED ORAL
Qty: 90 TAB | Refills: 3 | Status: SHIPPED | COMMUNITY
Start: 2018-10-12 | End: 2019-05-21

## 2018-10-29 ENCOUNTER — HOSPITAL ENCOUNTER (OUTPATIENT)
Dept: MAMMOGRAPHY | Age: 58
Discharge: HOME | End: 2018-10-29
Attending: FAMILY MEDICINE
Payer: OTHER GOVERNMENT

## 2018-10-29 DIAGNOSIS — Z12.31 VISIT FOR SCREENING MAMMOGRAM: ICD-10-CM

## 2018-10-29 PROCEDURE — 77067 SCR MAMMO BI INCL CAD: CPT

## 2018-11-06 ENCOUNTER — HOSPITAL ENCOUNTER (OUTPATIENT)
Dept: LAB | Age: 58
Discharge: HOME | End: 2018-11-06
Payer: OTHER GOVERNMENT

## 2018-11-06 DIAGNOSIS — R73.01 IFG (IMPAIRED FASTING GLUCOSE): ICD-10-CM

## 2018-11-06 DIAGNOSIS — E78.49 OTHER HYPERLIPIDEMIA: ICD-10-CM

## 2018-11-06 DIAGNOSIS — I10 ESSENTIAL HYPERTENSION: ICD-10-CM

## 2018-11-06 LAB
ALBUMIN SERPL-MCNC: 4.2 G/DL (ref 3.4–5)
ALBUMIN/GLOB SERPL: 1.3 {RATIO} (ref 0.8–1.7)
ALP SERPL-CCNC: 61 U/L (ref 45–117)
ALT SERPL-CCNC: 45 U/L (ref 13–56)
ANION GAP SERPL CALC-SCNC: 7 MMOL/L (ref 3–18)
AST SERPL-CCNC: 23 U/L (ref 15–37)
BILIRUB SERPL-MCNC: 0.5 MG/DL (ref 0.2–1)
BUN SERPL-MCNC: 18 MG/DL (ref 7–18)
BUN/CREAT SERPL: 23 (ref 12–20)
CALCIUM SERPL-MCNC: 9.2 MG/DL (ref 8.5–10.1)
CHLORIDE SERPL-SCNC: 104 MMOL/L (ref 100–108)
CHOLEST SERPL-MCNC: 279 MG/DL
CO2 SERPL-SCNC: 31 MMOL/L (ref 21–32)
CREAT SERPL-MCNC: 0.78 MG/DL (ref 0.6–1.3)
EST. AVERAGE GLUCOSE BLD GHB EST-MCNC: 131 MG/DL
GLOBULIN SER CALC-MCNC: 3.2 G/DL (ref 2–4)
GLUCOSE SERPL-MCNC: 101 MG/DL (ref 74–99)
HBA1C MFR BLD: 6.2 % (ref 4.2–5.6)
HDLC SERPL-MCNC: 53 MG/DL (ref 40–60)
HDLC SERPL: 5.3 {RATIO} (ref 0–5)
LDLC SERPL CALC-MCNC: 185.8 MG/DL (ref 0–100)
LIPID PROFILE,FLP: ABNORMAL
POTASSIUM SERPL-SCNC: 4.8 MMOL/L (ref 3.5–5.5)
PROT SERPL-MCNC: 7.4 G/DL (ref 6.4–8.2)
SODIUM SERPL-SCNC: 142 MMOL/L (ref 136–145)
TRIGL SERPL-MCNC: 201 MG/DL (ref ?–150)
VLDLC SERPL CALC-MCNC: 40.2 MG/DL

## 2018-11-06 PROCEDURE — 36415 COLL VENOUS BLD VENIPUNCTURE: CPT | Performed by: FAMILY MEDICINE

## 2018-11-06 PROCEDURE — 80053 COMPREHEN METABOLIC PANEL: CPT | Performed by: FAMILY MEDICINE

## 2018-11-06 PROCEDURE — 83036 HEMOGLOBIN GLYCOSYLATED A1C: CPT | Performed by: FAMILY MEDICINE

## 2018-11-06 PROCEDURE — 80061 LIPID PANEL: CPT | Performed by: FAMILY MEDICINE

## 2018-11-15 ENCOUNTER — OFFICE VISIT (OUTPATIENT)
Dept: FAMILY MEDICINE CLINIC | Age: 58
End: 2018-11-15

## 2018-11-15 VITALS
HEART RATE: 86 BPM | WEIGHT: 195 LBS | BODY MASS INDEX: 32.49 KG/M2 | TEMPERATURE: 98.1 F | RESPIRATION RATE: 18 BRPM | HEIGHT: 65 IN | SYSTOLIC BLOOD PRESSURE: 140 MMHG | DIASTOLIC BLOOD PRESSURE: 89 MMHG

## 2018-11-15 DIAGNOSIS — Z23 ENCOUNTER FOR IMMUNIZATION: ICD-10-CM

## 2018-11-15 DIAGNOSIS — E78.49 OTHER HYPERLIPIDEMIA: ICD-10-CM

## 2018-11-15 DIAGNOSIS — R73.01 IFG (IMPAIRED FASTING GLUCOSE): ICD-10-CM

## 2018-11-15 DIAGNOSIS — I10 ESSENTIAL HYPERTENSION: Primary | ICD-10-CM

## 2018-11-15 DIAGNOSIS — L98.9 SKIN LESION: ICD-10-CM

## 2018-11-15 DIAGNOSIS — K58.9 IRRITABLE BOWEL SYNDROME, UNSPECIFIED TYPE: ICD-10-CM

## 2018-11-15 NOTE — PROGRESS NOTES
Christen Butler presents today for   Chief Complaint   Patient presents with    Hypertension    Knee Pain     bilateral    Cholesterol Problem    Labs     completed 11/6/18       Christen Butler preferred language for health care discussion is english/other. Is someone accompanying this pt? no    Is the patient using any DME equipment during OV? no    Depression Screening:  PHQ over the last two weeks 8/15/2018   Little interest or pleasure in doing things Not at all   Feeling down, depressed, irritable, or hopeless Not at all   Total Score PHQ 2 0       Learning Assessment:  Learning Assessment 11/15/2018   PRIMARY LEARNER Patient   HIGHEST LEVEL OF EDUCATION - PRIMARY LEARNER  GRADUATED HIGH SCHOOL OR GED   BARRIERS PRIMARY LEARNER NONE   CO-LEARNER CAREGIVER No   PRIMARY LANGUAGE ENGLISH   LEARNER PREFERENCE PRIMARY LISTENING     -     -     -   ANSWERED BY self   RELATIONSHIP SELF       Abuse Screening:  Abuse Screening Questionnaire 8/15/2018   Do you ever feel afraid of your partner? N   Are you in a relationship with someone who physically or mentally threatens you? N   Is it safe for you to go home? Y           Coordination of Care:  1. Have you been to the ER, urgent care clinic since your last visit? Hospitalized since your last visit? no    2. Have you seen or consulted any other health care providers outside of the [address] [place] [address] since your last visit? Include any pap smears or colon screening. no      Advance Directive:  1. Do you have an advance directive in place? Patient Reply:no    2. If not, would you like material regarding how to put one in place?  Patient Reply: no

## 2018-11-15 NOTE — PATIENT INSTRUCTIONS
Vaccine Information Statement    Influenza (Flu) Vaccine (Inactivated or Recombinant): What you need to know    Many Vaccine Information Statements are available in [place] and other languages. See www.immunize.org/vis  Hojas de Información Sobre Vacunas están disponibles en Español y en muchos otros idiomas. Visite www.immunize.org/vis    1. Why get vaccinated? Influenza (flu) is a contagious disease that spreads around the [place] every year, usually between October and May. Flu is caused by influenza viruses, and is spread mainly by coughing, sneezing, and close contact. Anyone can get flu. Flu strikes suddenly and can last several days. Symptoms vary by age, but can include:   fever/chills   sore throat   muscle aches   fatigue   cough   headache    runny or stuffy nose    Flu can also lead to pneumonia and blood infections, and cause diarrhea and seizures in children. If you have a medical condition, such as heart or lung disease, flu can make it worse. Flu is more dangerous for some people. Infants and young children, people 72years of age and older, pregnant women, and people with certain health conditions or a weakened immune system are at greatest risk. Each year thousands of people in the [facility] die from flu, and many more are hospitalized. Flu vaccine can:   keep you from getting flu,   make flu less severe if you do get it, and   keep you from spreading flu to your family and other people. 2. Inactivated and recombinant flu vaccines    A dose of flu vaccine is recommended every flu season. Children 6 months through 6years of age may need two doses during the same flu season. Everyone else needs only one dose each flu season.        Some inactivated flu vaccines contain a very small amount of a mercury-based preservative called thimerosal. Studies have not shown thimerosal in vaccines to be harmful, but flu vaccines that do not contain thimerosal are available. There is no live flu virus in flu shots. They cannot cause the flu. There are many flu viruses, and they are always changing. Each year a new flu vaccine is made to protect against three or four viruses that are likely to cause disease in the upcoming flu season. But even when the vaccine doesnt exactly match these viruses, it may still provide some protection    Flu vaccine cannot prevent:   flu that is caused by a virus not covered by the vaccine, or   illnesses that look like flu but are not. It takes about 2 weeks for protection to develop after vaccination, and protection lasts through the flu season. 3. Some people should not get this vaccine    Tell the person who is giving you the vaccine:     If you have any severe, life-threatening allergies. If you ever had a life-threatening allergic reaction after a dose of flu vaccine, or have a severe allergy to any part of this vaccine, you may be advised not to get vaccinated. Most, but not all, types of flu vaccine contain a small amount of egg protein.  If you ever had Guillain-Barré Syndrome (also called GBS). Some people with a history of GBS should not get this vaccine. This should be discussed with your doctor.  If you are not feeling well. It is usually okay to get flu vaccine when you have a mild illness, but you might be asked to come back when you feel better. 4. Risks of a vaccine reaction    With any medicine, including vaccines, there is a chance of reactions. These are usually mild and go away on their own, but serious reactions are also possible. Most people who get a flu shot do not have any problems with it.      Minor problems following a flu shot include:    soreness, redness, or swelling where the shot was given     hoarseness   sore, red or itchy eyes   cough   fever   aches   headache   itching   fatigue  If these problems occur, they usually begin soon after the shot and last 1 or 2 days. More serious problems following a flu shot can include the following:     There may be a small increased risk of Guillain-Barré Syndrome (GBS) after inactivated flu vaccine. This risk has been estimated at 1 or 2 additional cases per million people vaccinated. This is much lower than the risk of severe complications from flu, which can be prevented by flu vaccine.  Young children who get the flu shot along with pneumococcal vaccine (PCV13) and/or DTaP vaccine at the same time might be slightly more likely to have a seizure caused by fever. Ask your doctor for more information. Tell your doctor if a child who is getting flu vaccine has ever had a seizure. Problems that could happen after any injected vaccine:      People sometimes faint after a medical procedure, including vaccination. Sitting or lying down for about 15 minutes can help prevent fainting, and injuries caused by a fall. Tell your doctor if you feel dizzy, or have vision changes or ringing in the ears.  Some people get severe pain in the shoulder and have difficulty moving the arm where a shot was given. This happens very rarely.  Any medication can cause a severe allergic reaction. Such reactions from a vaccine are very rare, estimated at about 1 in a million doses, and would happen within a few minutes to a few hours after the vaccination. As with any medicine, there is a very remote chance of a vaccine causing a serious injury or death. The safety of vaccines is always being monitored. For more information, visit: www.cdc.gov/vaccinesafety/    5. What if there is a serious reaction? What should I look for?  Look for anything that concerns you, such as signs of a severe allergic reaction, very high fever, or unusual behavior.     Signs of a severe allergic reaction can include hives, swelling of the face and throat, difficulty breathing, a fast heartbeat, dizziness, and weakness - usually within a few minutes to a few hours after the vaccination. What should I do?  If you think it is a severe allergic reaction or other emergency that cant wait, call 9-1-1 and get the person to the nearest hospital. Otherwise, call your doctor.  Reactions should be reported to the Vaccine Adverse Event Reporting System (VAERS). Your doctor should file this report, or you can do it yourself through  the VAERS web site at www.vaers. [facility].gov, or by calling 5-806.388.2681. VAERS does not give medical advice. 6. The National Vaccine Injury Compensation Program    The [facility] Vaccine Injury Compensation Program (VICP) is a federal program that was created to compensate people who may have been injured by certain vaccines. Persons who believe they may have been injured by a vaccine can learn about the program and about filing a claim by calling 7-295.932.5460 or visiting the [facility] website at www.[facility].gov/vaccinecompensation. There is a time limit to file a claim for compensation. 7. How can I learn more?  Ask your healthcare provider. He or she can give you the vaccine package insert or suggest other sources of information.  Call your local or state health department.  Contact the Centers for Disease Control and Prevention (CDC):  - Call 4-657.101.3039 (1-800-CDC-INFO) or  - Visit CDCs website at www.cdc.gov/flu    Vaccine Information Statement   Inactivated Influenza Vaccine   8/7/2015  44 ANDREEA Wolfe [id number]    Department of Health and Human Services  Centers for Disease Control and Prevention    Office Use Only       High Blood Pressure: Care Instructions  Your Care Instructions    If your blood pressure is usually above 130/80, you have high blood pressure, or hypertension. That means the top number is 130 or higher or the bottom number is 80 or higher, or both. Despite what a lot of people think, high blood pressure usually doesn't cause headaches or make you feel dizzy or lightheaded.  It usually has no symptoms. But it does increase your risk for heart attack, stroke, and kidney or eye damage. The higher your blood pressure, the more your risk increases. Your doctor will give you a goal for your blood pressure. Your goal will be based on your health and your age. Lifestyle changes, such as eating healthy and being active, are always important to help lower blood pressure. You might also take medicine to reach your blood pressure goal.  Follow-up care is a key part of your treatment and safety. Be sure to make and go to all appointments, and call your doctor if you are having problems. It's also a good idea to know your test results and keep a list of the medicines you take. How can you care for yourself at home? Medical treatment  · If you stop taking your medicine, your blood pressure will go back up. You may take one or more types of medicine to lower your blood pressure. Be safe with medicines. Take your medicine exactly as prescribed. Call your doctor if you think you are having a problem with your medicine. · Talk to your doctor before you start taking aspirin every day. Aspirin can help certain people lower their risk of a heart attack or stroke. But taking aspirin isn't right for everyone, because it can cause serious bleeding. · See your doctor regularly. You may need to see the doctor more often at first or until your blood pressure comes down. · If you are taking blood pressure medicine, talk to your doctor before you take decongestants or anti-inflammatory medicine, such as ibuprofen. Some of these medicines can raise blood pressure. · Learn how to check your blood pressure at home. Lifestyle changes  · Stay at a healthy weight. This is especially important if you put on weight around the waist. Losing even 10 pounds can help you lower your blood pressure. · If your doctor recommends it, get more exercise. Walking is a good choice. Bit by bit, increase the amount you walk every day.  Try for at least 30 minutes on most days of the week. You also may want to swim, bike, or do other activities. · Avoid or limit alcohol. Talk to your doctor about whether you can drink any alcohol. · Try to limit how much sodium you eat to less than 2,300 milligrams (mg) a day. Your doctor may ask you to try to eat less than 1,500 mg a day. · Eat plenty of fruits (such as bananas and oranges), vegetables, legumes, whole grains, and low-fat dairy products. · Lower the amount of saturated fat in your diet. Saturated fat is found in animal products such as milk, cheese, and meat. Limiting these foods may help you lose weight and also lower your risk for heart disease. · Do not smoke. Smoking increases your risk for heart attack and stroke. If you need help quitting, talk to your doctor about stop-smoking programs and medicines. These can increase your chances of quitting for good. When should you call for help? Call 911 anytime you think you may need emergency care. This may mean having symptoms that suggest that your blood pressure is causing a serious heart or blood vessel problem. Your blood pressure may be over 180/120.   For example, call 911 if:    · You have symptoms of a heart attack. These may include:  ? Chest pain or pressure, or a strange feeling in the chest.  ? Sweating. ? Shortness of breath. ? Nausea or vomiting. ? Pain, pressure, or a strange feeling in the back, neck, jaw, or upper belly or in one or both shoulders or arms. ? Lightheadedness or sudden weakness. ? A fast or irregular heartbeat.     · You have symptoms of a stroke. These may include:  ? Sudden numbness, tingling, weakness, or loss of movement in your face, arm, or leg, especially on only one side of your body. ? Sudden vision changes. ? Sudden trouble speaking. ? Sudden confusion or trouble understanding simple statements. ? Sudden problems with walking or balance.   ? A sudden, severe headache that is different from past headaches.     · You have severe back or belly pain.    Do not wait until your blood pressure comes down on its own. Get help right away.   Call your doctor now or seek immediate care if:    · Your blood pressure is much higher than normal (such as 180/120 or higher), but you don't have symptoms.     · You think high blood pressure is causing symptoms, such as:  ? Severe headache.  ? Blurry vision.    Watch closely for changes in your health, and be sure to contact your doctor if:    · Your blood pressure measures higher than your doctor recommends at least 2 times. That means the top number is higher or the bottom number is higher, or both.     · You think you may be having side effects from your blood pressure medicine. Where can you learn more? Go to http://edi-oneil.info/. Enter A709 in the search box to learn more about \"High Blood Pressure: Care Instructions. \"  Current as of: December 6, 2017  Content Version: 11.8  © 1374-0632 [employer]. Care instructions adapted under license by [employer] (which disclaims liability or warranty for this information). If you have questions about a medical condition or this instruction, always ask your healthcare professional. [address] any warranty or liability for your use of this information.

## 2018-11-15 NOTE — PROGRESS NOTES
Chief Complaint   Patient presents with    Hypertension    Knee Pain     bilateral    Cholesterol Problem    Labs     completed 11/6/18     HISTORY OF PRESENT ILLNESS  Julee Montague is a 66 y.o. female. HPI  Patient is here for a routine follow up of htn, knee pain, lipids. Patient had labs on 11/6/18. Labs reviewed in detail with patient     Patient does not need medication refills today. New concerns today: pt would like to try to stop her amitriptyline. She is on 10mg qhs for ibs. She does not have insomnia, IC, or chronic ha. Pt c/o a cyst on her back that will occasionally get inflamed and drain malodorous material.  She would like to see derm to have it removed. Pt requests flu shot today. ROS  Review of Systems   Constitutional: Negative. HENT: Negative. Respiratory: Negative. Cardiovascular: Negative. All other systems reviewed and are negative. Physical Exam  Physical Exam   Nursing note and vitals reviewed. Constitutional: She is oriented to person, place, and time. She appears well-developed and well-nourished. HENT:   Head: Normocephalic and atraumatic. Right Ear: External ear normal.   Left Ear: External ear normal.   Nose: Nose normal.   Eyes: Conjunctivae and EOM are normal.   Neck: Normal range of motion. Neck supple. No JVD present. Carotid bruit is not present. No thyromegaly present. Cardiovascular: Normal rate, regular rhythm, normal heart sounds and intact distal pulses. Exam reveals no gallop and no friction rub. No murmur heard. Pulmonary/Chest: Effort normal and breath sounds normal. She has no wheezes. She has no rhonchi. She has no rales. Abdominal: Soft. Bowel sounds are normal.   Musculoskeletal: Normal range of motion. Neurological: She is alert and oriented to person, place, and time. Coordination normal.   Skin: Skin is warm and dry. Psychiatric: She has a normal mood and affect.  Her behavior is normal. Judgment and thought content normal.     ASSESSMENT and PLAN  Diagnoses and all orders for this visit:    1. Essential hypertension  -     METABOLIC PANEL, COMPREHENSIVE; Future  -     LIPID PANEL; Future  Stable, cont pres tx plan. 2. Encounter for immunization  -     INFLUENZA VIRUS VAC QUAD,SPLIT,PRESV FREE SYRINGE IM    3. Skin lesion  -     REFERRAL TO DERMATOLOGY    4. Other hyperlipidemia  -     METABOLIC PANEL, COMPREHENSIVE; Future  -     LIPID PANEL; Future  Stable, cont pres tx plan. 5. IFG (impaired fasting glucose)  -     METABOLIC PANEL, COMPREHENSIVE; Future  -     LIPID PANEL; Future  -     HEMOGLOBIN A1C WITH EAG; Future  Discussed tlc. Recommend increasing exercise. 6. Irritable bowel syndrome, unspecified type  Ok to try off of amitriptyline; discussed taper. If sx recur after taper, ok to restart. Follow-up Disposition:  Return in about 3 months (around 2/15/2019) for high blood pressure, high cholesterol, elevated fasting blood sugar.

## 2019-02-06 ENCOUNTER — HOSPITAL ENCOUNTER (OUTPATIENT)
Dept: LAB | Age: 59
Discharge: HOME | End: 2019-02-06
Payer: OTHER GOVERNMENT

## 2019-02-06 DIAGNOSIS — R73.01 IFG (IMPAIRED FASTING GLUCOSE): ICD-10-CM

## 2019-02-06 DIAGNOSIS — E78.49 OTHER HYPERLIPIDEMIA: ICD-10-CM

## 2019-02-06 DIAGNOSIS — I10 ESSENTIAL HYPERTENSION: ICD-10-CM

## 2019-02-06 LAB
ALBUMIN SERPL-MCNC: 4.1 G/DL (ref 3.4–5)
ALBUMIN/GLOB SERPL: 1.4 {RATIO} (ref 0.8–1.7)
ALP SERPL-CCNC: 61 U/L (ref 45–117)
ALT SERPL-CCNC: 34 U/L (ref 13–56)
ANION GAP SERPL CALC-SCNC: 6 MMOL/L (ref 3–18)
AST SERPL-CCNC: 19 U/L (ref 15–37)
BILIRUB SERPL-MCNC: 0.6 MG/DL (ref 0.2–1)
BUN SERPL-MCNC: 14 MG/DL (ref 7–18)
BUN/CREAT SERPL: 22 (ref 12–20)
CALCIUM SERPL-MCNC: 8.9 MG/DL (ref 8.5–10.1)
CHLORIDE SERPL-SCNC: 106 MMOL/L (ref 100–108)
CHOLEST SERPL-MCNC: 277 MG/DL
CO2 SERPL-SCNC: 29 MMOL/L (ref 21–32)
CREAT SERPL-MCNC: 0.64 MG/DL (ref 0.6–1.3)
EST. AVERAGE GLUCOSE BLD GHB EST-MCNC: 123 MG/DL
GLOBULIN SER CALC-MCNC: 2.9 G/DL (ref 2–4)
GLUCOSE SERPL-MCNC: 99 MG/DL (ref 74–99)
HBA1C MFR BLD: 5.9 % (ref 4.2–5.6)
HDLC SERPL-MCNC: 55 MG/DL (ref 40–60)
HDLC SERPL: 5 {RATIO} (ref 0–5)
LDLC SERPL CALC-MCNC: 183.8 MG/DL (ref 0–100)
LIPID PROFILE,FLP: ABNORMAL
POTASSIUM SERPL-SCNC: 4.2 MMOL/L (ref 3.5–5.5)
PROT SERPL-MCNC: 7 G/DL (ref 6.4–8.2)
SODIUM SERPL-SCNC: 141 MMOL/L (ref 136–145)
TRIGL SERPL-MCNC: 191 MG/DL (ref ?–150)
VLDLC SERPL CALC-MCNC: 38.2 MG/DL

## 2019-02-06 PROCEDURE — 80061 LIPID PANEL: CPT

## 2019-02-06 PROCEDURE — 80053 COMPREHEN METABOLIC PANEL: CPT

## 2019-02-06 PROCEDURE — 83036 HEMOGLOBIN GLYCOSYLATED A1C: CPT

## 2019-02-06 PROCEDURE — 36415 COLL VENOUS BLD VENIPUNCTURE: CPT

## 2019-02-19 ENCOUNTER — OFFICE VISIT (OUTPATIENT)
Dept: FAMILY MEDICINE CLINIC | Age: 59
End: 2019-02-19

## 2019-02-19 VITALS
BODY MASS INDEX: 31.72 KG/M2 | HEIGHT: 65 IN | TEMPERATURE: 98.1 F | RESPIRATION RATE: 18 BRPM | SYSTOLIC BLOOD PRESSURE: 138 MMHG | HEART RATE: 74 BPM | DIASTOLIC BLOOD PRESSURE: 82 MMHG | WEIGHT: 190.4 LBS

## 2019-02-19 DIAGNOSIS — E78.49 OTHER HYPERLIPIDEMIA: ICD-10-CM

## 2019-02-19 DIAGNOSIS — J06.9 UPPER RESPIRATORY TRACT INFECTION, UNSPECIFIED TYPE: ICD-10-CM

## 2019-02-19 DIAGNOSIS — R73.01 IFG (IMPAIRED FASTING GLUCOSE): ICD-10-CM

## 2019-02-19 DIAGNOSIS — I10 ESSENTIAL HYPERTENSION: Primary | ICD-10-CM

## 2019-02-19 NOTE — PROGRESS NOTES
Chief Complaint Patient presents with  Hypertension  
  follow up  Cholesterol Problem  Blood sugar problem  Labs  
  completed 2/6/19  Sinus Infection Patient stated that she been having some sinus drainage and ear pain x weekened. HPI Benitez Childs is a 66 y.o. female presenting today for  3 months  follow up of htn, hld, ifg.   
 
Patient had labs on 2/6/19. Labs reviewed in detail with patient. Pt reports that she has started exercising 3-4 times per week and eating healthier. Pt has stopped the amitripyline as per our prior discussion. She has had increased stressors but is feeling ok off of the medication. She will cont off of it for now but realizes the stress of caring for her dad who has mesothelioma will likely cause increased sx later. She will resume the med when needed. Patient does not need medication refills today. New concerns today: pt reports that she has been having sinus drainage and sore throat for about 3-4 days. She is taking otc cold meds. She also has ear pain, L<R that started at the same time. Review of Systems Constitutional: Negative. HENT: Positive for congestion, ear pain and sore throat. Negative for sinus pain. Respiratory: Negative. Cardiovascular: Negative. All other systems reviewed and are negative. Physical Exam  
Constitutional: She is oriented to person, place, and time. She appears well-developed and well-nourished. No distress. HENT:  
Head: Normocephalic and atraumatic. Right Ear: Hearing, tympanic membrane, external ear and ear canal normal.  
Left Ear: Hearing, tympanic membrane, external ear and ear canal normal.  
Nose: Mucosal edema present. Right sinus exhibits no maxillary sinus tenderness and no frontal sinus tenderness. Left sinus exhibits no maxillary sinus tenderness and no frontal sinus tenderness.   
Mouth/Throat: Uvula is midline, oropharynx is clear and moist and mucous membranes are normal.  
Eyes: Conjunctivae and EOM are normal.  
Neck: Normal range of motion. Neck supple. No JVD present. No thyromegaly present. Cardiovascular: Normal rate, regular rhythm and normal heart sounds. Exam reveals no gallop and no friction rub. No murmur heard. Pulmonary/Chest: Effort normal and breath sounds normal. No respiratory distress. She has no wheezes. She has no rhonchi. She has no rales. Musculoskeletal: Normal range of motion. Lymphadenopathy:  
  She has cervical adenopathy. Neurological: She is alert and oriented to person, place, and time. Coordination normal.  
Skin: Skin is warm and dry. Psychiatric: She has a normal mood and affect. Her behavior is normal. Judgment and thought content normal.  
Nursing note and vitals reviewed. Diagnoses and all orders for this visit: 1. Essential hypertension -     METABOLIC PANEL, COMPREHENSIVE; Future -     LIPID PANEL; Future Stable, cont pres tx plan. 2. Other hyperlipidemia -     METABOLIC PANEL, COMPREHENSIVE; Future -     LIPID PANEL; Future Cont to work on diet and exercise. 3. IFG (impaired fasting glucose) -     METABOLIC PANEL, COMPREHENSIVE; Future -     LIPID PANEL; Future 
-     HEMOGLOBIN A1C WITH EAG; Future Improved. Pt commended. Cont to work on diet and exercise 4. Upper respiratory tract infection, unspecified type Recommend symptomatic treatment as needed. Follow-up Disposition: 
Return in about 3 months (around 5/19/2019) for elevated fasting blood sugar, high blood pressure, high cholesterol.

## 2019-05-14 ENCOUNTER — HOSPITAL ENCOUNTER (OUTPATIENT)
Dept: LAB | Age: 59
Discharge: HOME | End: 2019-05-14
Payer: OTHER GOVERNMENT

## 2019-05-14 DIAGNOSIS — R73.01 IFG (IMPAIRED FASTING GLUCOSE): ICD-10-CM

## 2019-05-14 DIAGNOSIS — I10 ESSENTIAL HYPERTENSION: ICD-10-CM

## 2019-05-14 DIAGNOSIS — E78.49 OTHER HYPERLIPIDEMIA: ICD-10-CM

## 2019-05-14 LAB
ALBUMIN SERPL-MCNC: 4 G/DL (ref 3.4–5)
ALBUMIN/GLOB SERPL: 1.4 {RATIO} (ref 0.8–1.7)
ALP SERPL-CCNC: 58 U/L (ref 45–117)
ALT SERPL-CCNC: 29 U/L (ref 13–56)
ANION GAP SERPL CALC-SCNC: 4 MMOL/L (ref 3–18)
AST SERPL-CCNC: 13 U/L (ref 15–37)
BILIRUB SERPL-MCNC: 0.4 MG/DL (ref 0.2–1)
BUN SERPL-MCNC: 16 MG/DL (ref 7–18)
BUN/CREAT SERPL: 22 (ref 12–20)
CALCIUM SERPL-MCNC: 9.2 MG/DL (ref 8.5–10.1)
CHLORIDE SERPL-SCNC: 107 MMOL/L (ref 100–108)
CHOLEST SERPL-MCNC: 252 MG/DL
CO2 SERPL-SCNC: 30 MMOL/L (ref 21–32)
CREAT SERPL-MCNC: 0.72 MG/DL (ref 0.6–1.3)
EST. AVERAGE GLUCOSE BLD GHB EST-MCNC: 120 MG/DL
GLOBULIN SER CALC-MCNC: 2.8 G/DL (ref 2–4)
GLUCOSE SERPL-MCNC: 108 MG/DL (ref 74–99)
HBA1C MFR BLD: 5.8 % (ref 4.2–5.6)
HDLC SERPL-MCNC: 58 MG/DL (ref 40–60)
HDLC SERPL: 4.3 {RATIO} (ref 0–5)
LDLC SERPL CALC-MCNC: 173 MG/DL (ref 0–100)
LIPID PROFILE,FLP: ABNORMAL
POTASSIUM SERPL-SCNC: 4.5 MMOL/L (ref 3.5–5.5)
PROT SERPL-MCNC: 6.8 G/DL (ref 6.4–8.2)
SODIUM SERPL-SCNC: 141 MMOL/L (ref 136–145)
TRIGL SERPL-MCNC: 105 MG/DL (ref ?–150)
VLDLC SERPL CALC-MCNC: 21 MG/DL

## 2019-05-14 PROCEDURE — 80061 LIPID PANEL: CPT

## 2019-05-14 PROCEDURE — 36415 COLL VENOUS BLD VENIPUNCTURE: CPT

## 2019-05-14 PROCEDURE — 80053 COMPREHEN METABOLIC PANEL: CPT

## 2019-05-14 PROCEDURE — 83036 HEMOGLOBIN GLYCOSYLATED A1C: CPT

## 2019-05-21 ENCOUNTER — OFFICE VISIT (OUTPATIENT)
Dept: FAMILY MEDICINE CLINIC | Age: 59
End: 2019-05-21

## 2019-05-21 VITALS
BODY MASS INDEX: 30.62 KG/M2 | TEMPERATURE: 97.7 F | DIASTOLIC BLOOD PRESSURE: 75 MMHG | HEART RATE: 62 BPM | HEIGHT: 65 IN | RESPIRATION RATE: 20 BRPM | WEIGHT: 183.8 LBS | SYSTOLIC BLOOD PRESSURE: 114 MMHG

## 2019-05-21 DIAGNOSIS — M54.12 CERVICAL RADICULOPATHY: ICD-10-CM

## 2019-05-21 DIAGNOSIS — N95.2 ATROPHIC VAGINITIS: ICD-10-CM

## 2019-05-21 DIAGNOSIS — E78.49 OTHER HYPERLIPIDEMIA: ICD-10-CM

## 2019-05-21 DIAGNOSIS — I10 ESSENTIAL HYPERTENSION: ICD-10-CM

## 2019-05-21 DIAGNOSIS — R73.01 IFG (IMPAIRED FASTING GLUCOSE): Primary | ICD-10-CM

## 2019-05-21 DIAGNOSIS — M54.2 NECK PAIN: ICD-10-CM

## 2019-05-21 NOTE — PROGRESS NOTES
Chief Complaint   Patient presents with    Blood sugar problem     elevated fasting blood glucose    Hypertension    Cholesterol Problem     high chol    Results     discuss lab results         HPI    Argenis Hart is a 66 y.o. female presenting today for 3 months  follow up of ifg, htn, hld. Pt has been doing well overall. She has lost her father in the last 4 wks. Patient had labs on 5/14/19. Labs reviewed in detail with patient. Patient does not need medication refills today. New concerns today:   Pt c/o pain in her L neck and tingling in the L arm occasionally. It happens several times throughout the day but resolves within 5 min if she repositions herself. She does have tingling of both hands due to positioning of her hands. Pt also c/o decreased libido. She has also had issues with vag dryness. Pt had a hyst many years ago. Review of Systems   Constitutional: Negative. HENT: Negative. Respiratory: Negative. Cardiovascular: Negative. Musculoskeletal: Positive for neck pain. All other systems reviewed and are negative. Physical Exam  Physical Exam   Nursing note and vitals reviewed. Constitutional: She is oriented to person, place, and time. She appears well-developed and well-nourished. HENT:   Head: Normocephalic and atraumatic. Right Ear: External ear normal.   Left Ear: External ear normal.   Nose: Nose normal.   Eyes: Conjunctivae and EOM are normal.   Neck: Normal range of motion. Neck supple. No JVD present. Carotid bruit is not present. No thyromegaly present. Cardiovascular: Normal rate, regular rhythm, normal heart sounds and intact distal pulses. Exam reveals no gallop and no friction rub. No murmur heard. Pulmonary/Chest: Effort normal and breath sounds normal. She has no wheezes. She has no rhonchi. She has no rales. Abdominal: Soft. Bowel sounds are normal.   Musculoskeletal: Normal range of motion.    Neurological: She is alert and oriented to person, place, and time. Coordination normal.   Skin: Skin is warm and dry. Psychiatric: She has a normal mood and affect. Her behavior is normal. Judgment and thought content normal.     Diagnoses and all orders for this visit:    1. IFG (impaired fasting glucose)  -     METABOLIC PANEL, COMPREHENSIVE; Future  -     LIPID PANEL; Future  -     HEMOGLOBIN A1C WITH EAG; Future    2. Essential hypertension  -     METABOLIC PANEL, COMPREHENSIVE; Future  -     LIPID PANEL; Future    3. Other hyperlipidemia  -     METABOLIC PANEL, COMPREHENSIVE; Future  -     LIPID PANEL; Future    4. Neck pain  -     REFERRAL TO PHYSICAL THERAPY    5. Cervical radiculopathy  -     REFERRAL TO PHYSICAL THERAPY    6. Atrophic vaginitis  -     estradiol (ESTRACE) 0.01 % (0.1 mg/gram) vaginal cream; Insert 4 g into vagina nightly for 14 days, THEN 2 g nightly for 7 days, THEN 1 g nightly for 7 days. Pt will receive Mychart msg in 1 month to ask how this med is going. Will refill based on response. Follow-up and Dispositions    · Return in about 4 months (around 9/21/2019) for elevated fasting blood sugar, high blood pressure, high cholesterol.

## 2019-05-21 NOTE — PROGRESS NOTES
Shemar Briaverna presents today for   Chief Complaint   Patient presents with    Blood sugar problem     elevated fasting blood glucose    Hypertension    Cholesterol Problem     high chol    Results     discuss lab results       Desirebaldo Noe preferred language for health care discussion is english/other. Is someone accompanying this pt? no    Is the patient using any DME equipment during [address]? no    Depression Screening:  3 most recent PHQ Screens 2/19/2019   Little interest or pleasure in doing things Not at all   Feeling down, depressed, irritable, or hopeless Not at all   Total Score PHQ 2 0       Learning Assessment:  Learning Assessment 2/19/2019   PRIMARY LEARNER Patient   HIGHEST LEVEL OF EDUCATION - PRIMARY LEARNER  GRADUATED HIGH SCHOOL OR GED   BARRIERS PRIMARY LEARNER NONE   CO-LEARNER CAREGIVER No   PRIMARY LANGUAGE ENGLISH   LEARNER PREFERENCE PRIMARY LISTENING     -     -     -   ANSWERED BY self   RELATIONSHIP SELF       Abuse Screening:  Abuse Screening Questionnaire 2/19/2019   Do you ever feel afraid of your partner? N   Are you in a relationship with someone who physically or mentally threatens you? N   Is it safe for you to go home? Y       Health Maintenance Due   Topic Date Due    Shingrix Vaccine Age 49> (1 of 2) 06/26/2012   . Health Maintenance reviewed and discussed and ordered per Provider. Shemar Liu is updated on all     Coordination of Care:  1. Have you been to the ER, urgent care clinic since your last visit? Hospitalized since your last visit? no    2. Have you seen or consulted any other health care providers outside of the [address] [place] [address] since your last visit? Include any pap smears or colon screening.  no      Advance care directive discussed 2/19/19

## 2019-05-25 RX ORDER — ESTRADIOL 0.1 MG/G
CREAM VAGINAL
Qty: 2 TUBE | Refills: 0 | Status: SHIPPED | OUTPATIENT
Start: 2019-05-25 | End: 2019-05-25 | Stop reason: SDUPTHER

## 2019-05-25 RX ORDER — ESTRADIOL 0.1 MG/G
CREAM VAGINAL
Qty: 2 TUBE | Refills: 0 | Status: SHIPPED | OUTPATIENT
Start: 2019-05-25 | End: 2019-06-22

## 2019-05-29 ENCOUNTER — OFFICE VISIT (OUTPATIENT)
Dept: FAMILY MEDICINE CLINIC | Age: 59
End: 2019-05-29

## 2019-05-29 ENCOUNTER — HOSPITAL ENCOUNTER (OUTPATIENT)
Dept: LAB | Age: 59
Discharge: HOME | End: 2019-05-29
Payer: OTHER GOVERNMENT

## 2019-05-29 VITALS
HEIGHT: 65 IN | DIASTOLIC BLOOD PRESSURE: 77 MMHG | RESPIRATION RATE: 20 BRPM | HEART RATE: 74 BPM | BODY MASS INDEX: 29.85 KG/M2 | SYSTOLIC BLOOD PRESSURE: 124 MMHG | TEMPERATURE: 97.8 F | WEIGHT: 179.2 LBS

## 2019-05-29 DIAGNOSIS — R68.83 CHILLS: ICD-10-CM

## 2019-05-29 DIAGNOSIS — R42 DIZZINESS: ICD-10-CM

## 2019-05-29 DIAGNOSIS — J30.2 SEASONAL ALLERGIC RHINITIS, UNSPECIFIED TRIGGER: Primary | ICD-10-CM

## 2019-05-29 DIAGNOSIS — J02.9 SORE THROAT: ICD-10-CM

## 2019-05-29 DIAGNOSIS — N30.00 ACUTE CYSTITIS WITHOUT HEMATURIA: ICD-10-CM

## 2019-05-29 DIAGNOSIS — R39.15 URINARY URGENCY: ICD-10-CM

## 2019-05-29 LAB
BILIRUB UR QL STRIP: NEGATIVE
GLUCOSE UR-MCNC: NEGATIVE MG/DL
KETONES P FAST UR STRIP-MCNC: NEGATIVE MG/DL
PH UR STRIP: 5.5 [PH] (ref 4.6–8)
PROT UR QL STRIP: NEGATIVE
QUICKVUE INFLUENZA TEST: NEGATIVE
S PYO AG THROAT QL: NEGATIVE
SP GR UR STRIP: 1.01 (ref 1–1.03)
UA UROBILINOGEN AMB POC: NORMAL (ref 0.2–1)
URINALYSIS CLARITY POC: CLEAR
URINALYSIS COLOR POC: YELLOW
URINE BLOOD POC: NORMAL
URINE LEUKOCYTES POC: NEGATIVE
URINE NITRITES POC: NEGATIVE
VALID INTERNAL CONTROL?: YES
VALID INTERNAL CONTROL?: YES

## 2019-05-29 PROCEDURE — 87086 URINE CULTURE/COLONY COUNT: CPT

## 2019-05-29 RX ORDER — CIPROFLOXACIN 500 MG/1
500 TABLET ORAL 2 TIMES DAILY
Qty: 6 TAB | Refills: 0 | Status: SHIPPED | OUTPATIENT
Start: 2019-05-29 | End: 2019-06-01

## 2019-05-29 RX ORDER — MONTELUKAST SODIUM 10 MG/1
10 TABLET ORAL DAILY
Qty: 30 TAB | Refills: 5 | Status: SHIPPED | OUTPATIENT
Start: 2019-05-29

## 2019-05-29 RX ORDER — PENICILLIN V POTASSIUM 500 MG/1
500 TABLET, FILM COATED ORAL 2 TIMES DAILY
Qty: 20 TAB | Refills: 0 | Status: SHIPPED | OUTPATIENT
Start: 2019-05-29 | End: 2019-06-08

## 2019-05-29 NOTE — PROGRESS NOTES
Marilou Velazco presents today for   Chief Complaint   Patient presents with    Sore Throat     sx started Sun, swollen lymph node on left side of neck    Chills    Dizziness       Marilou Velazco preferred language for health care discussion is english/other. Is someone accompanying this pt? no    Is the patient using any DME equipment during [address]? no    Depression Screening:  3 most recent PHQ Screens 2/19/2019   Little interest or pleasure in doing things Not at all   Feeling down, depressed, irritable, or hopeless Not at all   Total Score PHQ 2 0       Learning Assessment:  Learning Assessment 2/19/2019   PRIMARY LEARNER Patient   HIGHEST LEVEL OF EDUCATION - PRIMARY LEARNER  GRADUATED HIGH SCHOOL OR GED   BARRIERS PRIMARY LEARNER NONE   CO-LEARNER CAREGIVER No   PRIMARY LANGUAGE ENGLISH   LEARNER PREFERENCE PRIMARY LISTENING     -     -     -   ANSWERED BY self   RELATIONSHIP SELF       Abuse Screening:  Abuse Screening Questionnaire 2/19/2019   Do you ever feel afraid of your partner? N   Are you in a relationship with someone who physically or mentally threatens you? N   Is it safe for you to go home? Y       Health Maintenance Due   Topic Date Due    Shingrix Vaccine Age 49> (1 of 2) 06/26/2012   . Health Maintenance reviewed and discussed and ordered per Provider. Coordination of Care:  1. Have you been to the ER, urgent care clinic since your last visit? Hospitalized since your last visit? no    2. Have you seen or consulted any other health care providers outside of the [address] [place] [address] since your last visit? Include any pap smears or colon screening.  no

## 2019-05-29 NOTE — PROGRESS NOTES
Chief Complaint   Patient presents with    Sore Throat     sx started Sun, swollen lymph node on left side of neck    Chills    Dizziness     HISTORY OF PRESENT ILLNESS  Ginger Del Cid is a 66 y.o. female. HPI  Pt has been very tired lately and has felt dizzy as well. She had a sore throat, a swollen lymph node on her L posterior neck. Patient has had increased urinary urgency recently. She feels like she is somewhat dehydrated    Review of Systems   Constitutional: Positive for malaise/fatigue. HENT: Positive for sore throat. Respiratory: Negative. Cardiovascular: Negative. Genitourinary: Positive for urgency. Neurological: Positive for dizziness. All other systems reviewed and are negative. Physical Exam   Constitutional: She is oriented to person, place, and time. She appears well-developed and well-nourished. No distress. HENT:   Head: Normocephalic and atraumatic. Right Ear: Hearing, tympanic membrane, external ear and ear canal normal.   Left Ear: Hearing, tympanic membrane, external ear and ear canal normal.   Nose: Mucosal edema present. Mouth/Throat: Uvula is midline and mucous membranes are normal. Posterior oropharyngeal erythema present. No oropharyngeal exudate or posterior oropharyngeal edema. Eyes: Conjunctivae and EOM are normal.   Neck: Normal range of motion. Neck supple. No JVD present. No thyromegaly present. Cardiovascular: Normal rate, regular rhythm and normal heart sounds. Exam reveals no gallop and no friction rub. No murmur heard. Pulmonary/Chest: Effort normal and breath sounds normal. She has no wheezes. She has no rhonchi. She has no rales. Musculoskeletal: Normal range of motion. Lymphadenopathy:     She has no cervical adenopathy. Neurological: She is alert and oriented to person, place, and time. Coordination normal.   Skin: Skin is warm and dry. Psychiatric: She has a normal mood and affect.  Her behavior is normal. Judgment and thought content normal.   Nursing note and vitals reviewed. ASSESSMENT and PLAN  Diagnoses and all orders for this visit:    1. Seasonal allergic rhinitis, unspecified trigger  -     montelukast (SINGULAIR) 10 mg tablet; Take 1 Tab by mouth daily. 2. Sore throat  -     AMB POC RAPID INFLUENZA TEST  -     AMB POC RAPID STREP A    3. Chills  -     AMB POC RAPID INFLUENZA TEST    4. Dizziness  -     AMB POC RAPID INFLUENZA TEST    5. Urinary urgency  -     AMB POC URINALYSIS DIP STICK AUTO W/O MICRO    6. Acute cystitis without hematuria  -     ciprofloxacin HCl (CIPRO) 500 mg tablet; Take 1 Tab by mouth two (2) times a day for 3 days. 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 Will get a printed prescription in case her symptoms get worse. Patient understands parameters for which to fill this prescription. Additionally, the patient's  is a retired [employer] independent duty  and will monitor the changes in her health and make recommendations if he feels that she needs to start the antibiotic. Patient understands and agrees with the treatment plan  -     penicillin v potassium (VEETID) 500 mg tablet; Take 1 Tab by mouth two (2) times a day for 10 days. Follow-up and Dispositions    · Return if symptoms worsen or fail to improve.

## 2019-05-31 LAB
BACTERIA SPEC CULT: NORMAL
SERVICE CMNT-IMP: NORMAL

## 2019-07-23 DIAGNOSIS — E78.49 OTHER HYPERLIPIDEMIA: ICD-10-CM

## 2019-07-23 RX ORDER — FENOFIBRATE 145 MG/1
TABLET ORAL
Qty: 90 TAB | Refills: 3 | Status: SHIPPED | OUTPATIENT
Start: 2019-07-23 | End: 2019-12-18 | Stop reason: ALTCHOICE

## 2019-08-28 ENCOUNTER — HOSPITAL ENCOUNTER (OUTPATIENT)
Dept: LAB | Age: 59
Discharge: HOME | End: 2019-08-28
Payer: OTHER GOVERNMENT

## 2019-08-28 DIAGNOSIS — E78.49 OTHER HYPERLIPIDEMIA: ICD-10-CM

## 2019-08-28 DIAGNOSIS — R73.01 IFG (IMPAIRED FASTING GLUCOSE): ICD-10-CM

## 2019-08-28 DIAGNOSIS — I10 ESSENTIAL HYPERTENSION: ICD-10-CM

## 2019-08-28 LAB
ALBUMIN SERPL-MCNC: 4.3 G/DL (ref 3.4–5)
ALBUMIN/GLOB SERPL: 1.5 {RATIO} (ref 0.8–1.7)
ALP SERPL-CCNC: 60 U/L (ref 45–117)
ALT SERPL-CCNC: 37 U/L (ref 13–56)
ANION GAP SERPL CALC-SCNC: 9 MMOL/L (ref 3–18)
AST SERPL-CCNC: 23 U/L (ref 10–38)
BILIRUB SERPL-MCNC: 0.4 MG/DL (ref 0.2–1)
BUN SERPL-MCNC: 25 MG/DL (ref 7–18)
BUN/CREAT SERPL: 35 (ref 12–20)
CALCIUM SERPL-MCNC: 9.5 MG/DL (ref 8.5–10.1)
CHLORIDE SERPL-SCNC: 106 MMOL/L (ref 100–111)
CHOLEST SERPL-MCNC: 270 MG/DL
CO2 SERPL-SCNC: 30 MMOL/L (ref 21–32)
CREAT SERPL-MCNC: 0.72 MG/DL (ref 0.6–1.3)
EST. AVERAGE GLUCOSE BLD GHB EST-MCNC: 114 MG/DL
GLOBULIN SER CALC-MCNC: 2.8 G/DL (ref 2–4)
GLUCOSE SERPL-MCNC: 100 MG/DL (ref 74–99)
HBA1C MFR BLD: 5.6 % (ref 4.2–5.6)
HDLC SERPL-MCNC: 60 MG/DL (ref 40–60)
HDLC SERPL: 4.5 {RATIO} (ref 0–5)
LDLC SERPL CALC-MCNC: 183.2 MG/DL (ref 0–100)
LIPID PROFILE,FLP: ABNORMAL
POTASSIUM SERPL-SCNC: 4.6 MMOL/L (ref 3.5–5.5)
PROT SERPL-MCNC: 7.1 G/DL (ref 6.4–8.2)
SODIUM SERPL-SCNC: 145 MMOL/L (ref 136–145)
TRIGL SERPL-MCNC: 134 MG/DL (ref ?–150)
VLDLC SERPL CALC-MCNC: 26.8 MG/DL

## 2019-08-28 PROCEDURE — 80053 COMPREHEN METABOLIC PANEL: CPT

## 2019-08-28 PROCEDURE — 83036 HEMOGLOBIN GLYCOSYLATED A1C: CPT

## 2019-08-28 PROCEDURE — 36415 COLL VENOUS BLD VENIPUNCTURE: CPT

## 2019-08-28 PROCEDURE — 80061 LIPID PANEL: CPT

## 2019-09-10 ENCOUNTER — OFFICE VISIT (OUTPATIENT)
Dept: FAMILY MEDICINE CLINIC | Age: 59
End: 2019-09-10

## 2019-09-10 VITALS
WEIGHT: 174.6 LBS | SYSTOLIC BLOOD PRESSURE: 142 MMHG | RESPIRATION RATE: 18 BRPM | DIASTOLIC BLOOD PRESSURE: 84 MMHG | BODY MASS INDEX: 29.09 KG/M2 | HEIGHT: 65 IN | HEART RATE: 80 BPM | TEMPERATURE: 97.7 F

## 2019-09-10 DIAGNOSIS — H66.92 LEFT OTITIS MEDIA, UNSPECIFIED OTITIS MEDIA TYPE: ICD-10-CM

## 2019-09-10 DIAGNOSIS — E78.49 OTHER HYPERLIPIDEMIA: ICD-10-CM

## 2019-09-10 DIAGNOSIS — I10 ESSENTIAL HYPERTENSION: Primary | ICD-10-CM

## 2019-09-10 DIAGNOSIS — R73.01 IFG (IMPAIRED FASTING GLUCOSE): ICD-10-CM

## 2019-09-10 RX ORDER — AMOXICILLIN AND CLAVULANATE POTASSIUM 875; 125 MG/1; MG/1
1 TABLET, FILM COATED ORAL 2 TIMES DAILY
Qty: 20 TAB | Refills: 0 | Status: SHIPPED | OUTPATIENT
Start: 2019-09-10 | End: 2019-09-20

## 2019-09-10 RX ORDER — ATORVASTATIN CALCIUM 20 MG/1
20 TABLET, FILM COATED ORAL DAILY
Qty: 90 TAB | Refills: 4 | Status: SHIPPED | OUTPATIENT
Start: 2019-09-10 | End: 2020-11-02

## 2019-09-10 NOTE — PROGRESS NOTES
Kenneth Flaherty presents today for   Chief Complaint   Patient presents with    Blood sugar problem     follow up    Hypertension    Cholesterol Problem    Labs     completed d8/28/19    Ear Fullness     Patient stated that she been having left ear fullness x 1 week. Patient flo any pain.  Sore Throat     Patient stated that the right side of her throat is swolling x 1 week. Patient flo any pain. Davidomar Flaherty preferred language for health care discussion is english/other. Is someone accompanying this pt? no    Is the patient using any DME equipment during [address]? no    Depression Screening:  3 most recent PHQ Screens 2/19/2019   Little interest or pleasure in doing things Not at all   Feeling down, depressed, irritable, or hopeless Not at all   Total Score PHQ 2 0       Learning Assessment:  Learning Assessment 2/19/2019   PRIMARY LEARNER Patient   HIGHEST LEVEL OF EDUCATION - PRIMARY LEARNER  GRADUATED HIGH SCHOOL OR GED   BARRIERS PRIMARY LEARNER NONE   CO-LEARNER CAREGIVER No   PRIMARY LANGUAGE ENGLISH   LEARNER PREFERENCE PRIMARY LISTENING     -     -     -   ANSWERED BY self   RELATIONSHIP SELF       Abuse Screening:  Abuse Screening Questionnaire 2/19/2019   Do you ever feel afraid of your partner? N   Are you in a relationship with someone who physically or mentally threatens you? N   Is it safe for you to go home? Y       Generalized Anxiety  No flowsheet data found. Health Maintenance Due   Topic Date Due    Shingrix Vaccine Age 49> (1 of 2) 06/26/2012    Influenza Age 5 to Adult  08/01/2019   . Health Maintenance reviewed and discussed and ordered per Provider. Davidomar Flaherty is updated on all     Coordination of Care:  1. Have you been to the ER, urgent care clinic since your last visit? Hospitalized since your last visit? no    2. Have you seen or consulted any other health care providers outside of the [address] [place] [address] since your last visit?   Include any pap smears or colon screening. no      Advance Directive:  1. Do you have an advance directive in place? Patient Reply:no    2. If not, would you like material regarding how to put one in place?  Patient Reply: no

## 2019-09-10 NOTE — PATIENT INSTRUCTIONS
High Blood Pressure: Care Instructions  Overview    It's normal for blood pressure to go up and down throughout the day. But if it stays up, you have high blood pressure. Another name for high blood pressure is hypertension. Despite what a lot of people think, high blood pressure usually doesn't cause headaches or make you feel dizzy or lightheaded. It usually has no symptoms. But it does increase your risk of stroke, heart attack, and other problems. You and your doctor will talk about your risks of these problems based on your blood pressure. Your doctor will give you a goal for your blood pressure. Your goal will be based on your health and your age. Lifestyle changes, such as eating healthy and being active, are always important to help lower blood pressure. You might also take medicine to reach your blood pressure goal.  Follow-up care is a key part of your treatment and safety. Be sure to make and go to all appointments, and call your doctor if you are having problems. It's also a good idea to know your test results and keep a list of the medicines you take. How can you care for yourself at home? Medical treatment  · If you stop taking your medicine, your blood pressure will go back up. You may take one or more types of medicine to lower your blood pressure. Be safe with medicines. Take your medicine exactly as prescribed. Call your doctor if you think you are having a problem with your medicine. · Talk to your doctor before you start taking aspirin every day. Aspirin can help certain people lower their risk of a heart attack or stroke. But taking aspirin isn't right for everyone, because it can cause serious bleeding. · See your doctor regularly. You may need to see the doctor more often at first or until your blood pressure comes down. · If you are taking blood pressure medicine, talk to your doctor before you take decongestants or anti-inflammatory medicine, such as ibuprofen.  Some of these medicines can raise blood pressure. · Learn how to check your blood pressure at home. Lifestyle changes  · Stay at a healthy weight. This is especially important if you put on weight around the waist. Losing even 10 pounds can help you lower your blood pressure. · If your doctor recommends it, get more exercise. Walking is a good choice. Bit by bit, increase the amount you walk every day. Try for at least 30 minutes on most days of the week. You also may want to swim, bike, or do other activities. · Avoid or limit alcohol. Talk to your doctor about whether you can drink any alcohol. · Try to limit how much sodium you eat to less than 2,300 milligrams (mg) a day. Your doctor may ask you to try to eat less than 1,500 mg a day. · Eat plenty of fruits (such as bananas and oranges), vegetables, legumes, whole grains, and low-fat dairy products. · Lower the amount of saturated fat in your diet. Saturated fat is found in animal products such as milk, cheese, and meat. Limiting these foods may help you lose weight and also lower your risk for heart disease. · Do not smoke. Smoking increases your risk for heart attack and stroke. If you need help quitting, talk to your doctor about stop-smoking programs and medicines. These can increase your chances of quitting for good. When should you call for help? Call 911 anytime you think you may need emergency care. This may mean having symptoms that suggest that your blood pressure is causing a serious heart or blood vessel problem. Your blood pressure may be over 180/120.   For example, call 911 if:    · You have symptoms of a heart attack. These may include:  ? Chest pain or pressure, or a strange feeling in the chest.  ? Sweating. ? Shortness of breath. ? Nausea or vomiting. ? Pain, pressure, or a strange feeling in the back, neck, jaw, or upper belly or in one or both shoulders or arms. ? Lightheadedness or sudden weakness.   ? A fast or irregular heartbeat.     · You have symptoms of a stroke. These may include:  ? Sudden numbness, tingling, weakness, or loss of movement in your face, arm, or leg, especially on only one side of your body. ? Sudden vision changes. ? Sudden trouble speaking. ? Sudden confusion or trouble understanding simple statements. ? Sudden problems with walking or balance. ? A sudden, severe headache that is different from past headaches.     · You have severe back or belly pain.    Do not wait until your blood pressure comes down on its own. Get help right away.   Call your doctor now or seek immediate care if:    · Your blood pressure is much higher than normal (such as 180/120 or higher), but you don't have symptoms.     · You think high blood pressure is causing symptoms, such as:  ? Severe headache.  ? Blurry vision.    Watch closely for changes in your health, and be sure to contact your doctor if:    · Your blood pressure measures higher than your doctor recommends at least 2 times. That means the top number is higher or the bottom number is higher, or both.     · You think you may be having side effects from your blood pressure medicine. Where can you learn more? Go to http://edi-oneil.info/. Enter Y028 in the search box to learn more about \"High Blood Pressure: Care Instructions. \"  Current as of: July 22, 2018  Content Version: 12.1  © 1656-9490 [employer]. Care instructions adapted under license by [employer] (which disclaims liability or warranty for this information). If you have questions about a medical condition or this instruction, always ask your healthcare professional. [place] [address] any warranty or liability for your use of this information.

## 2019-09-10 NOTE — PROGRESS NOTES
Chief Complaint   Patient presents with    Blood sugar problem     follow up    Hypertension    Cholesterol Problem    Labs     completed d8/28/19    Ear Fullness     Patient stated that she been having left ear fullness x 1 week. Patient flo any pain.  Sore Throat     Patient stated that the right side of her throat is swolling x 1 week. Patient flo any pain. FARTUN Vogel is a 64 y.o. female presenting today for  4 months  follow up of htn, ifg, hld. Pt did not try the vaginal cream yet. Patient had labs on 8/28/19. Labs reviewed in detail with patient     Patient does not need medication refills today. New concerns today: pt c/o one week of L ear fullness along with sore throat. Review of Systems   HENT: Positive for ear pain and sore throat. Cardiovascular: Negative. Physical Exam   Constitutional: She is oriented to person, place, and time. She appears well-developed and well-nourished. No distress. HENT:   Head: Atraumatic. Macrocephalic. Right Ear: Hearing, external ear and ear canal normal. A middle ear effusion is present. Left Ear: Hearing, external ear and ear canal normal. Tympanic membrane is erythematous and bulging. A middle ear effusion is present. Nose: Mucosal edema present. Right sinus exhibits no maxillary sinus tenderness and no frontal sinus tenderness. Left sinus exhibits no maxillary sinus tenderness and no frontal sinus tenderness. Mouth/Throat: Uvula is midline and mucous membranes are normal. Posterior oropharyngeal erythema present. No oropharyngeal exudate or posterior oropharyngeal edema. Eyes: Conjunctivae and EOM are normal.   Neck: Normal range of motion. Neck supple. No JVD present. No thyromegaly present. Cardiovascular: Normal rate, regular rhythm and normal heart sounds. Exam reveals no gallop and no friction rub. No murmur heard.   Pulmonary/Chest: Effort normal and breath sounds normal. She has no wheezes. She has no rhonchi. She has no rales. Musculoskeletal: Normal range of motion. Lymphadenopathy:     She has no cervical adenopathy. Neurological: She is alert and oriented to person, place, and time. Coordination normal.   Skin: Skin is warm and dry. Psychiatric: She has a normal mood and affect. Her behavior is normal. Judgment and thought content normal.   Nursing note and vitals reviewed. Diagnoses and all orders for this visit:    1. Essential hypertension  -     METABOLIC PANEL, COMPREHENSIVE; Future  -     LIPID PANEL; Future  Stable, cont pres tx plan. 2. Other hyperlipidemia  -     atorvastatin (LIPITOR) 20 mg tablet; Take 1 Tab by mouth daily.  -     METABOLIC PANEL, COMPREHENSIVE; Future  -     LIPID PANEL; Future    3. IFG (impaired fasting glucose)  -     METABOLIC PANEL, COMPREHENSIVE; Future  -     LIPID PANEL; Future  Improved    4. Left otitis media, unspecified otitis media type  -     amoxicillin-clavulanate (AUGMENTIN) 875-125 mg per tablet; Take 1 Tab by mouth two (2) times a day for 10 days. Follow-up and Dispositions    · Return in about 3 months (around 12/10/2019) for elevated fasting blood sugar, high cholesterol, high blood pressure.

## 2019-11-01 ENCOUNTER — CLINICAL SUPPORT (OUTPATIENT)
Dept: FAMILY MEDICINE CLINIC | Age: 59
End: 2019-11-01

## 2019-11-01 ENCOUNTER — HOSPITAL ENCOUNTER (OUTPATIENT)
Dept: MAMMOGRAPHY | Age: 59
Discharge: HOME | End: 2019-11-01
Attending: FAMILY MEDICINE
Payer: OTHER GOVERNMENT

## 2019-11-01 VITALS
SYSTOLIC BLOOD PRESSURE: 138 MMHG | RESPIRATION RATE: 18 BRPM | HEIGHT: 65 IN | WEIGHT: 174 LBS | DIASTOLIC BLOOD PRESSURE: 87 MMHG | TEMPERATURE: 98 F | HEART RATE: 87 BPM | BODY MASS INDEX: 28.99 KG/M2

## 2019-11-01 DIAGNOSIS — Z23 ENCOUNTER FOR IMMUNIZATION: ICD-10-CM

## 2019-11-01 DIAGNOSIS — Z12.31 VISIT FOR SCREENING MAMMOGRAM: ICD-10-CM

## 2019-11-01 PROCEDURE — 77067 SCR MAMMO BI INCL CAD: CPT

## 2019-11-01 NOTE — PATIENT INSTRUCTIONS
Vaccine Information Statement    Influenza (Flu) Vaccine (Inactivated or Recombinant): What You Need to Know    Many Vaccine Information Statements are available in [place] and other languages. See www.immunize.org/vis  Hojas de información sobre vacunas están disponibles en español y en muchos otros idiomas. Visite www.immunize.org/vis    1. Why get vaccinated? Influenza vaccine can prevent influenza (flu). Flu is a contagious disease that spreads around the [facility] every year, usually between October and May. Anyone can get the flu, but it is more dangerous for some people. Infants and young children, people 72years of age and older, pregnant women, and people with certain health conditions or a weakened immune system are at greatest risk of flu complications. Pneumonia, bronchitis, sinus infections and ear infections are examples of flu-related complications. If you have a medical condition, such as heart disease, cancer or diabetes, flu can make it worse. Flu can cause fever and chills, sore throat, muscle aches, fatigue, cough, headache, and runny or stuffy nose. Some people may have vomiting and diarrhea, though this is more common in children than adults. Each year thousands of people in the [facility] die from flu, and many more are hospitalized. Flu vaccine prevents millions of illnesses and flu-related visits to the doctor each year. 2. Influenza vaccines     CDC recommends everyone 10months of age and older get vaccinated every flu season. Children 6 months through 6years of age may need 2 doses during a single flu season. Everyone else needs only 1 dose each flu season. It takes about 2 weeks for protection to develop after vaccination. There are many flu viruses, and they are always changing. Each year a new flu vaccine is made to protect against three or four viruses that are likely to cause disease in the upcoming flu season.  Even when the vaccine doesnt exactly match these viruses, it may still provide some protection. Influenza vaccine does not cause flu. Influenza vaccine may be given at the same time as other vaccines. 3. Talk with your health care provider    Tell your vaccine provider if the person getting the vaccine:   Has had an allergic reaction after a previous dose of influenza vaccine, or has any severe, life-threatening allergies.  Has ever had Guillain-Barré Syndrome (also called GBS). In some cases, your health care provider may decide to postpone influenza vaccination to a future visit. People with minor illnesses, such as a cold, may be vaccinated. People who are moderately or severely ill should usually wait until they recover before getting influenza vaccine. Your health care provider can give you more information. 4. Risks of a reaction     Soreness, redness, and swelling where shot is given, fever, muscle aches, and headache can happen after influenza vaccine.  There may be a very small increased risk of Guillain-Barré Syndrome (GBS) after inactivated influenza vaccine (the flu shot). [facility] children who get the flu shot along with pneumococcal vaccine (PCV13), and/or DTaP vaccine at the same time might be slightly more likely to have a seizure caused by fever. Tell your health care provider if a child who is getting flu vaccine has ever had a seizure. People sometimes faint after medical procedures, including vaccination. Tell your provider if you feel dizzy or have vision changes or ringing in the ears. As with any medicine, there is a very remote chance of a vaccine causing a severe allergic reaction, other serious injury, or death. 5. What if there is a serious problem? An allergic reaction could occur after the vaccinated person leaves the clinic.  If you see signs of a severe allergic reaction (hives, swelling of the face and throat, difficulty breathing, a fast heartbeat, dizziness, or weakness), call 9-1-1 and get the person to the nearest hospital.    For other signs that concern you, call your health care provider. Adverse reactions should be reported to the Vaccine Adverse Event Reporting System (VAERS). Your health care provider will usually file this report, or you can do it yourself. Visit the VAERS website at www.vaers. [facility].gov or call 7-276.358.4075. VAERS is only for reporting reactions, and VAERS staff do not give medical advice. 6. The National Vaccine Injury Compensation Program    The [facility] Vaccine Injury Compensation Program (VICP) is a federal program that was created to compensate people who may have been injured by certain vaccines. Visit the VICP website at www.[facility].gov/vaccinecompensation or call 8-383.862.1611 to learn about the program and about filing a claim. There is a time limit to file a claim for compensation. 7. How can I learn more?  Ask your health care provider.  Call your local or state health department.  Contact the Centers for Disease Control and Prevention (CDC):  - Call 4-793.455.7214 (1-800-CDC-INFO) or  - Visit CDCs influenza website at www.cdc.gov/flu    Vaccine Information Statement (Interim)  Inactivated Influenza Vaccine   8/15/2019  42 ANDREEA Cortes 208XT-20   Department of Health and Human Services  Centers for Disease Control and Prevention    Office Use Only

## 2019-11-01 NOTE — PROGRESS NOTES
Joe Padron 1960 female who presents for routine immunizations. Patient denies any symptoms , reactions or allergies that would exclude them from being immunized today. Risks and adverse reactions were discussed and the VIS was given to them. All questions were addressed. Order placed for Flu,  per Verbal Order from MAHESH Rao with read back. Patient was observed for 15 min post injection. There were no reactions observed.     Byron Mims, MIGUEL ÁNGELN

## 2019-12-02 ENCOUNTER — TELEPHONE (OUTPATIENT)
Dept: FAMILY MEDICINE CLINIC | Age: 59
End: 2019-12-02

## 2019-12-02 ENCOUNTER — OFFICE VISIT (OUTPATIENT)
Dept: FAMILY MEDICINE CLINIC | Age: 59
End: 2019-12-02

## 2019-12-02 VITALS
HEIGHT: 65 IN | SYSTOLIC BLOOD PRESSURE: 132 MMHG | BODY MASS INDEX: 30.16 KG/M2 | RESPIRATION RATE: 18 BRPM | OXYGEN SATURATION: 99 % | HEART RATE: 98 BPM | TEMPERATURE: 98.2 F | DIASTOLIC BLOOD PRESSURE: 76 MMHG | WEIGHT: 181 LBS

## 2019-12-02 DIAGNOSIS — J01.00 ACUTE NON-RECURRENT MAXILLARY SINUSITIS: Primary | ICD-10-CM

## 2019-12-02 RX ORDER — AMOXICILLIN AND CLAVULANATE POTASSIUM 875; 125 MG/1; MG/1
1 TABLET, FILM COATED ORAL 2 TIMES DAILY
Qty: 14 TAB | Refills: 0 | Status: SHIPPED | OUTPATIENT
Start: 2019-12-02 | End: 2019-12-09

## 2019-12-02 NOTE — PROGRESS NOTES
HPI  Pt of Dr Gary Seth. Two week history of \"head cold\". Over past day has developed sinus pressure and pain in face. Nasal congestion with thick mucus. Denies ill contacts. Hx of environmental allergies. Past Medical History  Past Medical History:   Diagnosis Date    Allergic rhinitis     Arthritis     Essential hypertension 5/24/2011    Hyperlipidemia     Hypertension     IBS (irritable bowel syndrome)     Migraine        Surgical History  Past Surgical History:   Procedure Laterality Date    HX HYSTERECTOMY      SELINA STEREO BX BREAST RT NDL CORE  06/18/2010        Medications  Current Outpatient Medications   Medication Sig Dispense Refill    amoxicillin-clavulanate (AUGMENTIN) 875-125 mg per tablet Take 1 Tab by mouth two (2) times a day for 7 days. 14 Tab 0    atorvastatin (LIPITOR) 20 mg tablet Take 1 Tab by mouth daily. 90 Tab 4    montelukast (SINGULAIR) 10 mg tablet Take 1 Tab by mouth daily. 30 Tab 5    hydroCHLOROthiazide (HYDRODIURIL) 25 mg tablet TAKE 1 TABLET DAILY 90 Tab 4    cetirizine (ZYRTEC) 10 mg tablet Take  by mouth daily.  omega-3 fatty acids-vitamin e (FISH OIL) 1,000 mg Cap Take 1 Cap by mouth daily.  1200mg      TRICOR 145 mg tablet TAKE 1 TABLET DAILY 90 Tab 3       Allergies  No Known Allergies    Family History  Family History   Problem Relation Age of Onset    Other Mother         sepsis    Hypertension Father     High Cholesterol Father     Lung Disease Father         mesothelioma       Social History  Social History     Socioeconomic History    Marital status:      Spouse name: Not on file    Number of children: Not on file    Years of education: Not on file    Highest education level: Not on file   Occupational History    Not on file   Social Needs    Financial resource strain: Not on file    Food insecurity:     Worry: Not on file     Inability: Not on file    Transportation needs:     Medical: Not on file     Non-medical: Not on file Tobacco Use    Smoking status: Former Smoker    Smokeless tobacco: Never Used   Substance and Sexual Activity    Alcohol use: Yes     Comment: moderate    Drug use: No    Sexual activity: Not on file   Lifestyle    Physical activity:     Days per week: Not on file     Minutes per session: Not on file    Stress: Not on file   Relationships    Social connections:     Talks on phone: Not on file     Gets together: Not on file     Attends Sabianism service: Not on file     Active member of club or organization: Not on file     Attends meetings of clubs or organizations: Not on file     Relationship status: Not on file    Intimate partner violence:     Fear of current or ex partner: Not on file     Emotionally abused: Not on file     Physically abused: Not on file     Forced sexual activity: Not on file   Other Topics Concern    Not on file   Social History Narrative    Not on file       Problem List  Patient Active Problem List   Diagnosis Code    Essential hypertension I10    Hyperlipidemia E78.5    AR (allergic rhinitis) J30.9    Allergic conjunctivitis H10.10    Irritable bowel syndrome K58.9    Arthritis knees, hips, right shoulder M19.90    Prediabetes R73.03    Hypertriglyceridemia E78.1    Acute non-recurrent maxillary sinusitis J01.00       Review of Systems  Review of Systems   Constitutional: Negative. HENT: Positive for congestion and ear pain. Pressure in ears   Respiratory: Negative. Musculoskeletal: Negative. Neurological:        Sinus headache       Vital Signs  Vitals:    12/02/19 1602   BP: 132/76   Pulse: 98   Resp: 18   Temp: 98.2 °F (36.8 °C)   TempSrc: Oral   SpO2: 99%   Weight: 181 lb (82.1 kg)   Height: 5' 4.5\" (1.638 m)   PainSc:   3       Physical Exam  Physical Exam  Vitals signs and nursing note reviewed. Constitutional:       Appearance: Normal appearance. She is ill-appearing.    HENT:      Right Ear: Tympanic membrane, ear canal and external ear normal.      Left Ear: Tympanic membrane, ear canal and external ear normal.      Nose: Mucosal edema present. Right Sinus: Maxillary sinus tenderness present. Left Sinus: Maxillary sinus tenderness present. Mouth/Throat:      Mouth: Mucous membranes are moist.      Pharynx: Oropharynx is clear. Neck:      Musculoskeletal: Normal range of motion and neck supple. Cardiovascular:      Rate and Rhythm: Normal rate and regular rhythm. Heart sounds: Normal heart sounds. Pulmonary:      Effort: Pulmonary effort is normal.      Breath sounds: Normal breath sounds. Lymphadenopathy:      Cervical: No cervical adenopathy. Skin:     General: Skin is warm and dry. Neurological:      Mental Status: She is alert and oriented to person, place, and time. Diagnostics  Orders Placed This Encounter    amoxicillin-clavulanate (AUGMENTIN) 875-125 mg per tablet     Sig: Take 1 Tab by mouth two (2) times a day for 7 days. Dispense:  14 Tab     Refill:  0       Results  Results for orders placed or performed during the hospital encounter of 93/23/30   METABOLIC PANEL, COMPREHENSIVE   Result Value Ref Range    Sodium 145 136 - 145 mmol/L    Potassium 4.6 3.5 - 5.5 mmol/L    Chloride 106 100 - 111 mmol/L    CO2 30 21 - 32 mmol/L    Anion gap 9 3.0 - 18 mmol/L    Glucose 100 (H) 74 - 99 mg/dL    BUN 25 (H) 7.0 - 18 MG/DL    Creatinine 0.72 0.6 - 1.3 MG/DL    BUN/Creatinine ratio 35 (H) 12 - 20      GFR est AA >60 >60 ml/min/1.73m2    GFR est non-AA >60 >60 ml/min/1.73m2    Calcium 9.5 8.5 - 10.1 MG/DL    Bilirubin, total 0.4 0.2 - 1.0 MG/DL    ALT (SGPT) 37 13 - 56 U/L    AST (SGOT) 23 10 - 38 U/L    Alk.  phosphatase 60 45 - 117 U/L    Protein, total 7.1 6.4 - 8.2 g/dL    Albumin 4.3 3.4 - 5.0 g/dL    Globulin 2.8 2.0 - 4.0 g/dL    A-G Ratio 1.5 0.8 - 1.7     LIPID PANEL   Result Value Ref Range    LIPID PROFILE          Cholesterol, total 270 (H) <200 MG/DL    Triglyceride 134 <150 MG/DL    HDL Cholesterol 60 40 - 60 MG/DL    LDL, calculated 183.2 (H) 0 - 100 MG/DL    VLDL, calculated 26.8 MG/DL    CHOL/HDL Ratio 4.5 0 - 5.0     HEMOGLOBIN A1C WITH EAG   Result Value Ref Range    Hemoglobin A1c 5.6 4.2 - 5.6 %    Est. average glucose 114 mg/dL       Assessment and Plan  Diagnoses and all orders for this visit:    1. Acute non-recurrent maxillary sinusitis  -     amoxicillin-clavulanate (AUGMENTIN) 875-125 mg per tablet; Take 1 Tab by mouth two (2) times a day for 7 days. Antibiotics as directed, increase fluid intake, gargle with salt water, saline nasal spray, steam.  Continue with Zyrtec and Singulair. After care summary printed and reviewed with patient. Plan reviewed with patient. Questions answered. Patient verbalized understanding of plan and is in agreement with plan. Patient to follow up as needed if symptoms worsen/ do not improve. Encouraged the use of my chart.     Baldo Bhatia, CANDICEC

## 2019-12-02 NOTE — PROGRESS NOTES
Joejohn Scottre presents today for   Chief Complaint   Patient presents with    Sinus Pain     c/o stuffy nose, facial pain for 1 day, sinus congestion also. Is someone accompanying this pt? No    Is the patient using any DME equipment during OV? No    Depression Screening:  3 most recent PHQ Screens 2/19/2019   Little interest or pleasure in doing things Not at all   Feeling down, depressed, irritable, or hopeless Not at all   Total Score PHQ 2 0       Learning Assessment:  Learning Assessment 2/19/2019   PRIMARY LEARNER Patient   HIGHEST LEVEL OF EDUCATION - PRIMARY LEARNER  GRADUATED HIGH SCHOOL OR GED   BARRIERS PRIMARY LEARNER NONE   CO-LEARNER CAREGIVER No   PRIMARY LANGUAGE ENGLISH   LEARNER PREFERENCE PRIMARY LISTENING     -     -     -   ANSWERED BY self   RELATIONSHIP SELF       Abuse Screening:  Abuse Screening Questionnaire 2/19/2019   Do you ever feel afraid of your partner? N   Are you in a relationship with someone who physically or mentally threatens you? N   Is it safe for you to go home? Y         Health Maintenance Due   Topic Date Due    Shingrix Vaccine Age 49> (1 of 2) 06/26/2012   . Health Maintenance reviewed and discussed and ordered per Provider. Joe Padron is updated on all     Coordination of Care  1. Have you been to the ER, urgent care clinic since your last visit? Hospitalized since your last visit? No    2. Have you seen or consulted any other health care providers outside of the [address] [place] [address] since your last visit? Include any pap smears or colon screening. No        Advance Directive:  1. Do you have an advance directive in place? Patient Reply:No    2. If not, would you like material regarding how to put one in place?  Patient Reply: No

## 2019-12-03 NOTE — PROGRESS NOTES
Received a call during after hours regarding antibiotic prescription. Seen provider today and was given antibiotic and said it was not at the pharmacy. I was not able to open the system remotely so called in augmentin for 10 days. Later on noted she was given augmentin for 7 days. Will notify PCP if needed to change it to 7 days . Pt was aware that I was not able to excess the system and I have called in augmentin as per patient was for  sinusitis.

## 2019-12-09 ENCOUNTER — HOSPITAL ENCOUNTER (OUTPATIENT)
Dept: LAB | Age: 59
Discharge: HOME | End: 2019-12-09
Payer: OTHER GOVERNMENT

## 2019-12-09 DIAGNOSIS — E78.49 OTHER HYPERLIPIDEMIA: ICD-10-CM

## 2019-12-09 DIAGNOSIS — R73.01 IFG (IMPAIRED FASTING GLUCOSE): ICD-10-CM

## 2019-12-09 DIAGNOSIS — I10 ESSENTIAL HYPERTENSION: ICD-10-CM

## 2019-12-09 LAB
ALBUMIN SERPL-MCNC: 3.6 G/DL (ref 3.4–5)
ALBUMIN/GLOB SERPL: 1.1 {RATIO} (ref 0.8–1.7)
ALP SERPL-CCNC: 97 U/L (ref 45–117)
ALT SERPL-CCNC: 16 U/L (ref 13–56)
ANION GAP SERPL CALC-SCNC: 6 MMOL/L (ref 3–18)
AST SERPL-CCNC: 9 U/L (ref 10–38)
BILIRUB SERPL-MCNC: 1.2 MG/DL (ref 0.2–1)
BUN SERPL-MCNC: 12 MG/DL (ref 7–18)
BUN/CREAT SERPL: 20 (ref 12–20)
CALCIUM SERPL-MCNC: 9.1 MG/DL (ref 8.5–10.1)
CHLORIDE SERPL-SCNC: 103 MMOL/L (ref 100–111)
CHOLEST SERPL-MCNC: 164 MG/DL
CO2 SERPL-SCNC: 31 MMOL/L (ref 21–32)
CREAT SERPL-MCNC: 0.61 MG/DL (ref 0.6–1.3)
GLOBULIN SER CALC-MCNC: 3.2 G/DL (ref 2–4)
GLUCOSE SERPL-MCNC: 101 MG/DL (ref 74–99)
HDLC SERPL-MCNC: 41 MG/DL (ref 40–60)
HDLC SERPL: 4 {RATIO} (ref 0–5)
LDLC SERPL CALC-MCNC: 97.4 MG/DL (ref 0–100)
LIPID PROFILE,FLP: NORMAL
POTASSIUM SERPL-SCNC: 4.3 MMOL/L (ref 3.5–5.5)
PROT SERPL-MCNC: 6.8 G/DL (ref 6.4–8.2)
SODIUM SERPL-SCNC: 140 MMOL/L (ref 136–145)
TRIGL SERPL-MCNC: 128 MG/DL (ref ?–150)
VLDLC SERPL CALC-MCNC: 25.6 MG/DL

## 2019-12-09 PROCEDURE — 36415 COLL VENOUS BLD VENIPUNCTURE: CPT

## 2019-12-09 PROCEDURE — 80053 COMPREHEN METABOLIC PANEL: CPT

## 2019-12-09 PROCEDURE — 80061 LIPID PANEL: CPT

## 2019-12-18 ENCOUNTER — OFFICE VISIT (OUTPATIENT)
Dept: FAMILY MEDICINE CLINIC | Age: 59
End: 2019-12-18

## 2019-12-18 VITALS
WEIGHT: 178.2 LBS | HEART RATE: 65 BPM | RESPIRATION RATE: 18 BRPM | SYSTOLIC BLOOD PRESSURE: 130 MMHG | BODY MASS INDEX: 29.69 KG/M2 | HEIGHT: 65 IN | DIASTOLIC BLOOD PRESSURE: 85 MMHG | TEMPERATURE: 97.6 F

## 2019-12-18 DIAGNOSIS — E78.49 OTHER HYPERLIPIDEMIA: ICD-10-CM

## 2019-12-18 DIAGNOSIS — R73.01 IFG (IMPAIRED FASTING GLUCOSE): ICD-10-CM

## 2019-12-18 DIAGNOSIS — M79.671 BILATERAL FOOT PAIN: ICD-10-CM

## 2019-12-18 DIAGNOSIS — M79.672 BILATERAL FOOT PAIN: ICD-10-CM

## 2019-12-18 DIAGNOSIS — I10 ESSENTIAL HYPERTENSION: Primary | ICD-10-CM

## 2019-12-18 RX ORDER — DICLOFENAC SODIUM 10 MG/G
GEL TOPICAL 4 TIMES DAILY
Qty: 300 G | Refills: 4 | Status: SHIPPED | OUTPATIENT
Start: 2019-12-18

## 2019-12-18 RX ORDER — HYDROCHLOROTHIAZIDE 25 MG/1
25 TABLET ORAL DAILY
Qty: 90 TAB | Refills: 4 | Status: SHIPPED | OUTPATIENT
Start: 2019-12-18 | End: 2021-05-18 | Stop reason: SDUPTHER

## 2019-12-18 NOTE — PROGRESS NOTES
George Childress presents today for   Chief Complaint   Patient presents with    Blood sugar problem     follow up    Cholesterol Problem    Hypertension    Labs     completed 12/11/19    Foot Pain     Patient stated that she been having bilateral foot pain x 2 weeks. Patient stated that her pain is 1/10. George Childress preferred language for health care discussion is english/other. Is someone accompanying this pt? no    Is the patient using any DME equipment during [address]? no    Depression Screening:  3 most recent PHQ Screens 2/19/2019   Little interest or pleasure in doing things Not at all   Feeling down, depressed, irritable, or hopeless Not at all   Total Score PHQ 2 0       Learning Assessment:  Learning Assessment 2/19/2019   PRIMARY LEARNER Patient   HIGHEST LEVEL OF EDUCATION - PRIMARY LEARNER  GRADUATED HIGH SCHOOL OR GED   BARRIERS PRIMARY LEARNER NONE   CO-LEARNER CAREGIVER No   PRIMARY LANGUAGE ENGLISH   LEARNER PREFERENCE PRIMARY LISTENING     -     -     -   ANSWERED BY self   RELATIONSHIP SELF       Abuse Screening:  Abuse Screening Questionnaire 2/19/2019   Do you ever feel afraid of your partner? N   Are you in a relationship with someone who physically or mentally threatens you? N   Is it safe for you to go home? Y       Generalized Anxiety  No flowsheet data found. Health Maintenance Due   Topic Date Due    Shingrix Vaccine Age 49> (1 of 2) 06/26/2012   . Health Maintenance reviewed and discussed and ordered per Provider. George Childress is updated on all     Coordination of Care:  1. Have you been to the ER, urgent care clinic since your last visit? Hospitalized since your last visit? no    2. Have you seen or consulted any other health care providers outside of the [address] [place] [address] since your last visit? Include any pap smears or colon screening. no      Advance Directive:  1. Do you have an advance directive in place? Patient Reply:no    2.  If not, would you like material regarding how to put one in place?  Patient Reply: no

## 2019-12-18 NOTE — PROGRESS NOTES
Chief Complaint   Patient presents with    Blood sugar problem     follow up    Cholesterol Problem    Hypertension    Labs     completed 12/11/19    Foot Pain     Patient stated that she been having bilateral foot pain x 2 weeks. Patient stated that her pain is 1/10. HPI    Francheska Perez is a 64 y.o. female presenting today for 3 months  follow up of htn, hld, ifg.    Patient had labs on 12/9/19. Labs reviewed in detail with patient     New concerns today: pt c/o b foot pain. She recently finds that she has pain in the ball of foot that radiates to her toes. Review of Systems   Constitutional: Negative. HENT: Negative. Respiratory: Negative. Cardiovascular: Negative. Musculoskeletal:        Foot pain   All other systems reviewed and are negative. Physical Exam  Physical Exam   Nursing note and vitals reviewed. Constitutional: She is oriented to person, place, and time. She appears well-developed and well-nourished. HENT:   Head: Normocephalic and atraumatic. Right Ear: External ear normal.   Left Ear: External ear normal.   Nose: Nose normal.   Eyes: Conjunctivae and EOM are normal.   Neck: Normal range of motion. Neck supple. No JVD present. Carotid bruit is not present. No thyromegaly present. Cardiovascular: Normal rate, regular rhythm, normal heart sounds and intact distal pulses. Exam reveals no gallop and no friction rub. No murmur heard. Pulmonary/Chest: Effort normal and breath sounds normal. She has no wheezes. She has no rhonchi. She has no rales. Abdominal: Soft. Bowel sounds are normal.   Musculoskeletal: Normal range of motion. Neurological: She is alert and oriented to person, place, and time. Coordination normal.   Skin: Skin is warm and dry. Psychiatric: She has a normal mood and affect. Her behavior is normal. Judgment and thought content normal.     Diagnoses and all orders for this visit:    1.  Essential hypertension  - hydroCHLOROthiazide (HYDRODIURIL) 25 mg tablet; Take 1 Tab by mouth daily. Stable, cont pres tx plan. 2. Bilateral foot pain  - trial:     diclofenac (VOLTAREN) 1 % gel; Apply  to affected area four (4) times daily. 3. IFG (impaired fasting glucose)  Stable, cont pres tx plan. 4. Other hyperlipidemia  Improved. Cont pres tx plan. Follow-up and Dispositions    · Return in about 3 months (around 3/18/2020) for elevated fasting blood sugar, high blood pressure, high cholesterol.

## 2020-08-18 ENCOUNTER — PATIENT MESSAGE (OUTPATIENT)
Dept: FAMILY MEDICINE CLINIC | Age: 60
End: 2020-08-18

## 2020-08-18 DIAGNOSIS — R73.01 IFG (IMPAIRED FASTING GLUCOSE): Primary | ICD-10-CM

## 2020-08-18 DIAGNOSIS — I10 ESSENTIAL HYPERTENSION: ICD-10-CM

## 2020-08-18 DIAGNOSIS — E78.49 OTHER HYPERLIPIDEMIA: ICD-10-CM

## 2020-08-20 NOTE — TELEPHONE ENCOUNTER
From: Jolene Hanley  To: Viraj Barlow MD  Sent: 8/18/2020 12:34 PM EDT  Subject: Test Results Question    Would you put me in for bloodwork for my next appointment.      Thank you  Marvin May

## 2020-08-25 ENCOUNTER — HOSPITAL ENCOUNTER (OUTPATIENT)
Dept: LAB | Age: 60
Discharge: HOME | End: 2020-08-25
Payer: OTHER GOVERNMENT

## 2020-08-25 DIAGNOSIS — I10 ESSENTIAL HYPERTENSION: ICD-10-CM

## 2020-08-25 DIAGNOSIS — E78.49 OTHER HYPERLIPIDEMIA: ICD-10-CM

## 2020-08-25 DIAGNOSIS — R73.01 IFG (IMPAIRED FASTING GLUCOSE): ICD-10-CM

## 2020-08-25 LAB
ALBUMIN SERPL-MCNC: 3.8 G/DL (ref 3.4–5)
ALBUMIN/GLOB SERPL: 1.3 {RATIO} (ref 0.8–1.7)
ALP SERPL-CCNC: 106 U/L (ref 45–117)
ALT SERPL-CCNC: 23 U/L (ref 13–56)
ANION GAP SERPL CALC-SCNC: 4 MMOL/L (ref 3–18)
AST SERPL-CCNC: 13 U/L (ref 10–38)
BILIRUB SERPL-MCNC: 0.5 MG/DL (ref 0.2–1)
BUN SERPL-MCNC: 15 MG/DL (ref 7–18)
BUN/CREAT SERPL: 25 (ref 12–20)
CALCIUM SERPL-MCNC: 8.9 MG/DL (ref 8.5–10.1)
CHLORIDE SERPL-SCNC: 107 MMOL/L (ref 100–111)
CHOLEST SERPL-MCNC: 188 MG/DL
CO2 SERPL-SCNC: 33 MMOL/L (ref 21–32)
CREAT SERPL-MCNC: 0.59 MG/DL (ref 0.6–1.3)
EST. AVERAGE GLUCOSE BLD GHB EST-MCNC: 114 MG/DL
GLOBULIN SER CALC-MCNC: 2.9 G/DL (ref 2–4)
GLUCOSE SERPL-MCNC: 101 MG/DL (ref 74–99)
HBA1C MFR BLD: 5.6 % (ref 4.2–5.6)
HDLC SERPL-MCNC: 62 MG/DL (ref 40–60)
HDLC SERPL: 3 {RATIO} (ref 0–5)
LDLC SERPL CALC-MCNC: 103.6 MG/DL (ref 0–100)
LIPID PROFILE,FLP: ABNORMAL
POTASSIUM SERPL-SCNC: 4.1 MMOL/L (ref 3.5–5.5)
PROT SERPL-MCNC: 6.7 G/DL (ref 6.4–8.2)
SODIUM SERPL-SCNC: 144 MMOL/L (ref 136–145)
TRIGL SERPL-MCNC: 112 MG/DL (ref ?–150)
VLDLC SERPL CALC-MCNC: 22.4 MG/DL

## 2020-08-25 PROCEDURE — 80061 LIPID PANEL: CPT

## 2020-08-25 PROCEDURE — 80053 COMPREHEN METABOLIC PANEL: CPT

## 2020-08-25 PROCEDURE — 83036 HEMOGLOBIN GLYCOSYLATED A1C: CPT

## 2020-08-25 PROCEDURE — 36415 COLL VENOUS BLD VENIPUNCTURE: CPT

## 2020-08-27 ENCOUNTER — VIRTUAL VISIT (OUTPATIENT)
Dept: FAMILY MEDICINE CLINIC | Age: 60
End: 2020-08-27

## 2020-08-27 DIAGNOSIS — L71.9 ROSACEA: ICD-10-CM

## 2020-08-27 DIAGNOSIS — L98.9 SKIN LESIONS: ICD-10-CM

## 2020-08-27 DIAGNOSIS — I10 ESSENTIAL HYPERTENSION: Primary | ICD-10-CM

## 2020-08-27 DIAGNOSIS — R73.01 IFG (IMPAIRED FASTING GLUCOSE): ICD-10-CM

## 2020-08-27 DIAGNOSIS — E78.49 OTHER HYPERLIPIDEMIA: ICD-10-CM

## 2020-08-27 NOTE — PROGRESS NOTES
Hanna García presents today for   Chief Complaint   Patient presents with    Blood sugar problem     Follow up    Hypertension     Follow up    Cholesterol Problem     Follow up   [facility]     labs completed 8/25/20       Is someone accompanying this pt? No    Is the patient using any DME equipment during OV? No    Depression Screening:  3 most recent PHQ Screens 8/27/2020   Little interest or pleasure in doing things Not at all   Feeling down, depressed, irritable, or hopeless Not at all   Total Score PHQ 2 0       Learning Assessment:  Learning Assessment 2/19/2019   PRIMARY LEARNER Patient   HIGHEST LEVEL OF EDUCATION - PRIMARY LEARNER  GRADUATED HIGH SCHOOL OR GED   BARRIERS PRIMARY LEARNER NONE   CO-LEARNER CAREGIVER No   PRIMARY LANGUAGE ENGLISH   LEARNER PREFERENCE PRIMARY LISTENING     -     -     -   ANSWERED BY self   RELATIONSHIP SELF       Abuse Screening:  Abuse Screening Questionnaire 8/27/2020   Do you ever feel afraid of your partner? N   Are you in a relationship with someone who physically or mentally threatens you? N   Is it safe for you to go home? Y         Health Maintenance Due   Topic Date Due    A1C test (Diabetic or Prediabetic)  08/28/2020   . Health Maintenance reviewed and discussed and ordered per Provider. Coordination of Care  1. Have you been to the ER, urgent care clinic since your last visit? Hospitalized since your last visit? No    2. Have you seen or consulted any other health care providers outside of the [address] [place] [address] since your last visit? Include any pap smears or colon screening. No      Advance Directive:  1. Do you have an advance directive in place? Patient Reply:No    2. If not, would you like material regarding how to put one in place?  Patient Reply: No

## 2020-08-27 NOTE — PROGRESS NOTES
Thierry Callahan is a 64 y.o. female who was seen by synchronous (real-time) audio-video technology on 8/27/2020 for Blood sugar problem (Follow up); Hypertension (Follow up); Cholesterol Problem (Follow up); and Labs (labs completed 8/25/20)        Assessment & Plan:   Diagnoses and all orders for this visit:    1. Essential hypertension  Recommend routine home blood pressure monitoring. 2. IFG (impaired fasting glucose)  Stable, cont pres tx plan. 3. Other hyperlipidemia  Stable, cont pres tx plan. 4. Skin lesions  -     REFERRAL TO DERMATOLOGY    5. Rosacea  -     REFERRAL TO DERMATOLOGY      The complexity of medical decision making for this visit is moderate   Follow-up and Dispositions    · Return in about 3 months (around 11/27/2020) for high blood pressure, high cholesterol, elevated fasting blood sugar. 712  Subjective:   Patient contacted via doxy. me. Pt has been doing well overall. Recent labs reviewed in detail. Pt needs an updated referral to derm for recheck of moles. Also has prior hx of rosacea    Pt has had the new shingles vaccinations. Prior to Admission medications    Medication Sig Start Date End Date Taking? Authorizing Provider   diclofenac (VOLTAREN) 1 % gel Apply  to affected area four (4) times daily. 12/18/19  Yes Blanka Lemon MD   hydroCHLOROthiazide (HYDRODIURIL) 25 mg tablet Take 1 Tab by mouth daily. 12/18/19  Yes Blanka Lemon MD   atorvastatin (LIPITOR) 20 mg tablet Take 1 Tab by mouth daily. 9/10/19  Yes Blanka Lemon MD   cetirizine (ZYRTEC) 10 mg tablet Take  by mouth daily. Yes Provider, Historical   omega-3 fatty acids-vitamin e (FISH OIL) 1,000 mg Cap Take 1 Cap by mouth daily. 1200mg   Yes Provider, Historical   montelukast (SINGULAIR) 10 mg tablet Take 1 Tab by mouth daily.  5/29/19   Blanka Lemon MD     Patient Active Problem List   Diagnosis Code    Essential hypertension I10    Hyperlipidemia E78.5    AR (allergic rhinitis) J30.9    Allergic conjunctivitis H10.10    Irritable bowel syndrome K58.9    Arthritis knees, hips, right shoulder M19.90    Prediabetes R73.03    Hypertriglyceridemia E78.1    Acute non-recurrent maxillary sinusitis J01.00     Current Outpatient Medications   Medication Sig Dispense Refill    diclofenac (VOLTAREN) 1 % gel Apply  to affected area four (4) times daily. 300 g 4    hydroCHLOROthiazide (HYDRODIURIL) 25 mg tablet Take 1 Tab by mouth daily. 90 Tab 4    atorvastatin (LIPITOR) 20 mg tablet Take 1 Tab by mouth daily. 90 Tab 4    cetirizine (ZYRTEC) 10 mg tablet Take  by mouth daily.  omega-3 fatty acids-vitamin e (FISH OIL) 1,000 mg Cap Take 1 Cap by mouth daily. 1200mg      montelukast (SINGULAIR) 10 mg tablet Take 1 Tab by mouth daily. 30 Tab 5     No Known Allergies  Past Medical History:   Diagnosis Date    Allergic rhinitis     Arthritis     Endometriosis     had hyst after failed ablation    Essential hypertension 2011    Hyperlipidemia     Hypertension     IBS (irritable bowel syndrome)     Migraine      Past Surgical History:   Procedure Laterality Date    HX HYSTERECTOMY      SELINA STEREO BX BREAST RT NDL CORE  2010     Family History   Problem Relation Age of Onset    Other Mother         sepsis    Hypertension Father     High Cholesterol Father     Lung Disease Father         mesothelioma     Social History     Tobacco Use    Smoking status: Former Smoker     Packs/day: 1.50     Years: 5.00     Pack years: 7.50     Types: Cigarettes     Last attempt to quit: 1986     Years since quittin.6    Smokeless tobacco: Never Used   Substance Use Topics    Alcohol use: Yes     Comment: moderate       Review of Systems   Constitutional: Negative. HENT: Negative. Respiratory: Negative. Cardiovascular: Negative. All other systems reviewed and are negative. Objective:   No flowsheet data found.    General: alert, cooperative, no distress   Mental  status: normal mood, behavior, speech, dress, motor activity, and thought processes, able to follow commands   HENT: NCAT   Neck: no visualized mass   Resp: no respiratory distress   Neuro: no gross deficits   Skin: no discoloration or lesions of concern on visible areas   Psychiatric: normal affect, consistent with stated mood, no evidence of hallucinations     Additional exam findings: none      We discussed the expected course, resolution and complications of the diagnosis(es) in detail. Medication risks, benefits, costs, interactions, and alternatives were discussed as indicated. I advised her to contact the office if her condition worsens, changes or fails to improve as anticipated. She expressed understanding with the diagnosis(es) and plan. Bc Olson, who was evaluated through a patient-initiated, synchronous (real-time) audio-video encounter, and/or her healthcare decision maker, is aware that it is a billable service, with coverage as determined by her insurance carrier. She provided verbal consent to proceed: Yes, and patient identification was verified. It was conducted pursuant to the emergency declaration under the [address] [place] [address] authority and the [employer] General Act. A caregiver was present when appropriate. Ability to conduct physical exam was limited. I was in the office. The patient was at home.       Loc Mckeon MD

## 2020-10-02 ENCOUNTER — TRANSCRIBE ORDER (OUTPATIENT)
Dept: SCHEDULING | Age: 60
End: 2020-10-02

## 2020-10-02 DIAGNOSIS — Z12.31 VISIT FOR SCREENING MAMMOGRAM: Primary | ICD-10-CM

## 2020-11-02 DIAGNOSIS — E78.49 OTHER HYPERLIPIDEMIA: ICD-10-CM

## 2020-11-02 RX ORDER — ATORVASTATIN CALCIUM 20 MG/1
TABLET, FILM COATED ORAL
Qty: 90 TAB | Refills: 3 | Status: SHIPPED | OUTPATIENT
Start: 2020-11-02

## 2020-11-09 ENCOUNTER — VIRTUAL VISIT (OUTPATIENT)
Dept: FAMILY MEDICINE CLINIC | Age: 60
End: 2020-11-09
Payer: OTHER GOVERNMENT

## 2020-11-09 DIAGNOSIS — E78.49 OTHER HYPERLIPIDEMIA: ICD-10-CM

## 2020-11-09 DIAGNOSIS — G47.00 INSOMNIA, UNSPECIFIED TYPE: ICD-10-CM

## 2020-11-09 DIAGNOSIS — R73.01 IFG (IMPAIRED FASTING GLUCOSE): Primary | ICD-10-CM

## 2020-11-09 DIAGNOSIS — I10 ESSENTIAL HYPERTENSION: ICD-10-CM

## 2020-11-09 PROCEDURE — 99214 OFFICE O/P EST MOD 30 MIN: CPT | Performed by: FAMILY MEDICINE

## 2020-11-09 RX ORDER — TRAZODONE HYDROCHLORIDE 50 MG/1
50 TABLET ORAL
Qty: 30 TAB | Refills: 4 | Status: SHIPPED | OUTPATIENT
Start: 2020-11-09

## 2020-11-09 NOTE — PROGRESS NOTES
Jennifer Rod presents today for   Chief Complaint   Patient presents with    Cholesterol Problem     Doxy phone number [phone]    Hypertension       Virtual/telephone visit    Depression Screening:  3 most recent PHQ Screens 11/9/2020   Little interest or pleasure in doing things Not at all   Feeling down, depressed, irritable, or hopeless Not at all   Total Score PHQ 2 0       Learning Assessment:  Learning Assessment 2/19/2019   PRIMARY LEARNER Patient   HIGHEST LEVEL OF EDUCATION - PRIMARY LEARNER  GRADUATED HIGH SCHOOL OR GED   BARRIERS PRIMARY LEARNER NONE   CO-LEARNER CAREGIVER No   PRIMARY LANGUAGE ENGLISH   LEARNER PREFERENCE PRIMARY LISTENING     -     -     -   ANSWERED BY self   RELATIONSHIP SELF       Health Maintenance reviewed and discussed and ordered per Provider. Health Maintenance Due   Topic Date Due    Flu Vaccine (1) 09/01/2020   . Coordination of Care:  1. Have you been to the ER, urgent care clinic since your last visit? Hospitalized since your last visit? no    2. Have you seen or consulted any other health care providers outside of the [address] [place] [address] since your last visit? Include any pap smears or colon screening. Yes, Dermatology on 11-2-2020 waiting on note. Patient has an appointment on 11/30/2020 for her mammogram to be done.

## 2020-11-09 NOTE — PROGRESS NOTES
Saida Licona is a 64 y.o. female who was seen by synchronous (real-time) audio-video technology on 11/9/2020 for Cholesterol Problem (Doxy phone number [phone]) and Hypertension        Assessment & Plan:   Diagnoses and all orders for this visit:    1. IFG (impaired fasting glucose)  -     METABOLIC PANEL, COMPREHENSIVE; Future  -     LIPID PANEL; Future  -     HEMOGLOBIN A1C WITH EAG; Future    2. Essential hypertension  -     METABOLIC PANEL, COMPREHENSIVE; Future  -     LIPID PANEL; Future    3. Other hyperlipidemia  -     METABOLIC PANEL, COMPREHENSIVE; Future  -     LIPID PANEL; Future    4. Insomnia, unspecified type  -     traZODone (DESYREL) 50 mg tablet; Take 1 Tab by mouth nightly as needed for Sleep. The complexity of medical decision making for this visit is moderate   Follow-up and Dispositions    · Return in about 3 months (around 2/9/2021) for elevated fasting blood sugar, high blood pressure, high cholesterol, insomnia, lab review. 712  Subjective:   Patient contacted via doxy. me. Pt has been doing well overall. She is a little stressed about her upcoming move. Pt has had trouble sleeping. She falls asleep but then awakens thinking about things she needs to do. She has tried taking zyrtec or tylenol pm or benadryl. Home bp readings have been normotensive. Prior to Admission medications    Medication Sig Start Date End Date Taking? Authorizing Provider   traZODone (DESYREL) 50 mg tablet Take 1 Tab by mouth nightly as needed for Sleep. 11/9/20  Yes Peter Berger MD   atorvastatin (LIPITOR) 20 mg tablet TAKE 1 TABLET DAILY 11/2/20  Yes Lev Fisher MD   diclofenac (VOLTAREN) 1 % gel Apply  to affected area four (4) times daily. 12/18/19  Yes Peter Berger MD   hydroCHLOROthiazide (HYDRODIURIL) 25 mg tablet Take 1 Tab by mouth daily. 12/18/19  Yes Peter Berger MD   cetirizine (ZYRTEC) 10 mg tablet Take  by mouth daily.    Yes Provider, Historical omega-3 fatty acids-vitamin e (FISH OIL) 1,000 mg Cap Take 1 Cap by mouth daily. 1200mg   Yes Provider, Historical   montelukast (SINGULAIR) 10 mg tablet Take 1 Tab by mouth daily. 5/29/19   Korin Messina MD     Patient Active Problem List   Diagnosis Code    Essential hypertension I10    Hyperlipidemia E78.5    AR (allergic rhinitis) J30.9    Allergic conjunctivitis H10.10    Irritable bowel syndrome K58.9    Arthritis knees, hips, right shoulder M19.90    Prediabetes R73.03    Hypertriglyceridemia E78.1    Acute non-recurrent maxillary sinusitis J01.00     Current Outpatient Medications   Medication Sig Dispense Refill    traZODone (DESYREL) 50 mg tablet Take 1 Tab by mouth nightly as needed for Sleep. 30 Tab 4    atorvastatin (LIPITOR) 20 mg tablet TAKE 1 TABLET DAILY 90 Tab 3    diclofenac (VOLTAREN) 1 % gel Apply  to affected area four (4) times daily. 300 g 4    hydroCHLOROthiazide (HYDRODIURIL) 25 mg tablet Take 1 Tab by mouth daily. 90 Tab 4    cetirizine (ZYRTEC) 10 mg tablet Take  by mouth daily.  omega-3 fatty acids-vitamin e (FISH OIL) 1,000 mg Cap Take 1 Cap by mouth daily. 1200mg      montelukast (SINGULAIR) 10 mg tablet Take 1 Tab by mouth daily.  30 Tab 5     No Known Allergies  Past Medical History:   Diagnosis Date    Allergic rhinitis     Arthritis     Endometriosis     had hyst after failed ablation    Essential hypertension 5/24/2011    Hyperlipidemia     Hypertension     IBS (irritable bowel syndrome)     Migraine      Past Surgical History:   Procedure Laterality Date    HX HYSTERECTOMY      SELINA STEREO BX BREAST RT NDL CORE  06/18/2010     Family History   Problem Relation Age of Onset    Other Mother         sepsis    Hypertension Father     High Cholesterol Father     Lung Disease Father         mesothelioma     Social History     Tobacco Use    Smoking status: Former Smoker     Packs/day: 1.50     Years: 5.00     Pack years: 7.50     Types: Cigarettes Last attempt to quit: 1986     Years since quittin.8    Smokeless tobacco: Never Used   Substance Use Topics    Alcohol use: Yes     Comment: moderate       Review of Systems   Constitutional: Negative. HENT: Negative. Respiratory: Negative. Cardiovascular: Negative. Psychiatric/Behavioral: The patient has insomnia. All other systems reviewed and are negative. Objective:     Patient-Reported Vitals 2020   Patient-Reported Systolic  709   Patient-Reported Diastolic 64      General: alert, cooperative, no distress   Mental  status: normal mood, behavior, speech, dress, motor activity, and thought processes, able to follow commands   HENT: NCAT   Neck: no visualized mass   Resp: no respiratory distress   Neuro: no gross deficits   Skin: no discoloration or lesions of concern on visible areas   Psychiatric: normal affect, consistent with stated mood, no evidence of hallucinations     Additional exam findings: none      We discussed the expected course, resolution and complications of the diagnosis(es) in detail. Medication risks, benefits, costs, interactions, and alternatives were discussed as indicated. I advised her to contact the office if her condition worsens, changes or fails to improve as anticipated. She expressed understanding with the diagnosis(es) and plan. Fabricio Elizondo, who was evaluated through a patient-initiated, synchronous (real-time) audio-video encounter, and/or her healthcare decision maker, is aware that it is a billable service, with coverage as determined by her insurance carrier. She provided verbal consent to proceed: n/a- consent obtained within past 12 months, and patient identification was verified. It was conducted pursuant to the emergency declaration under the [address] [place] [address], [address] [place] [address] authority and the [employer] General Act.  A caregiver was present when appropriate. Ability to conduct physical exam was limited. I was in the office. The patient was at home.       Jac Rosen MD

## 2020-11-30 ENCOUNTER — HOSPITAL ENCOUNTER (OUTPATIENT)
Dept: MAMMOGRAPHY | Age: 60
Discharge: HOME | End: 2020-11-30
Attending: FAMILY MEDICINE
Payer: OTHER GOVERNMENT

## 2020-11-30 DIAGNOSIS — Z12.31 VISIT FOR SCREENING MAMMOGRAM: ICD-10-CM

## 2020-11-30 PROCEDURE — 77063 BREAST TOMOSYNTHESIS BI: CPT

## 2020-12-16 ENCOUNTER — HOSPITAL ENCOUNTER (OUTPATIENT)
Dept: LAB | Age: 60
Discharge: HOME | End: 2020-12-16
Payer: OTHER GOVERNMENT

## 2020-12-16 DIAGNOSIS — R73.01 IFG (IMPAIRED FASTING GLUCOSE): ICD-10-CM

## 2020-12-16 DIAGNOSIS — E78.49 OTHER HYPERLIPIDEMIA: ICD-10-CM

## 2020-12-16 DIAGNOSIS — I10 ESSENTIAL HYPERTENSION: ICD-10-CM

## 2020-12-16 LAB
ALBUMIN SERPL-MCNC: 4.1 G/DL (ref 3.4–5)
ALBUMIN/GLOB SERPL: 1.4 {RATIO} (ref 0.8–1.7)
ALP SERPL-CCNC: 98 U/L (ref 45–117)
ALT SERPL-CCNC: 27 U/L (ref 13–56)
ANION GAP SERPL CALC-SCNC: 4 MMOL/L (ref 3–18)
AST SERPL-CCNC: 14 U/L (ref 10–38)
BILIRUB SERPL-MCNC: 0.6 MG/DL (ref 0.2–1)
BUN SERPL-MCNC: 18 MG/DL (ref 7–18)
BUN/CREAT SERPL: 27 (ref 12–20)
CALCIUM SERPL-MCNC: 9.5 MG/DL (ref 8.5–10.1)
CHLORIDE SERPL-SCNC: 103 MMOL/L (ref 100–111)
CHOLEST SERPL-MCNC: 207 MG/DL
CO2 SERPL-SCNC: 33 MMOL/L (ref 21–32)
CREAT SERPL-MCNC: 0.67 MG/DL (ref 0.6–1.3)
EST. AVERAGE GLUCOSE BLD GHB EST-MCNC: 111 MG/DL
GLOBULIN SER CALC-MCNC: 3 G/DL (ref 2–4)
GLUCOSE SERPL-MCNC: 102 MG/DL (ref 74–99)
HBA1C MFR BLD: 5.5 % (ref 4.2–5.6)
HDLC SERPL-MCNC: 59 MG/DL (ref 40–60)
HDLC SERPL: 3.5 {RATIO} (ref 0–5)
LDLC SERPL CALC-MCNC: 109.2 MG/DL (ref 0–100)
LIPID PROFILE,FLP: ABNORMAL
POTASSIUM SERPL-SCNC: 4.4 MMOL/L (ref 3.5–5.5)
PROT SERPL-MCNC: 7.1 G/DL (ref 6.4–8.2)
SODIUM SERPL-SCNC: 140 MMOL/L (ref 136–145)
TRIGL SERPL-MCNC: 194 MG/DL (ref ?–150)
VLDLC SERPL CALC-MCNC: 38.8 MG/DL

## 2020-12-16 PROCEDURE — 80053 COMPREHEN METABOLIC PANEL: CPT

## 2020-12-16 PROCEDURE — 80061 LIPID PANEL: CPT

## 2020-12-16 PROCEDURE — 36415 COLL VENOUS BLD VENIPUNCTURE: CPT

## 2020-12-16 PROCEDURE — 83036 HEMOGLOBIN GLYCOSYLATED A1C: CPT

## 2021-02-01 ENCOUNTER — VIRTUAL VISIT (OUTPATIENT)
Dept: FAMILY MEDICINE CLINIC | Age: 61
End: 2021-02-01
Payer: OTHER GOVERNMENT

## 2021-02-01 DIAGNOSIS — R68.89 ABNORMAL FINDING: ICD-10-CM

## 2021-02-01 DIAGNOSIS — R73.01 IFG (IMPAIRED FASTING GLUCOSE): Primary | ICD-10-CM

## 2021-02-01 DIAGNOSIS — I10 ESSENTIAL HYPERTENSION: ICD-10-CM

## 2021-02-01 DIAGNOSIS — E78.49 OTHER HYPERLIPIDEMIA: ICD-10-CM

## 2021-02-01 DIAGNOSIS — Z71.85 VACCINE COUNSELING: ICD-10-CM

## 2021-02-01 PROCEDURE — 99214 OFFICE O/P EST MOD 30 MIN: CPT | Performed by: FAMILY MEDICINE

## 2021-02-01 NOTE — PROGRESS NOTES
Vivienne Gutierrez presents today for   Chief Complaint   Patient presents with    Blood sugar problem    Hypertension    Cholesterol Problem    Labs     Completed 12/16/20       Virtual/telephone visit    Depression Screening:  3 most recent PHQ Screens 2/1/2021   Little interest or pleasure in doing things Not at all   Feeling down, depressed, irritable, or hopeless Not at all   Total Score PHQ 2 0       Learning Assessment:  Learning Assessment 2/1/2021   PRIMARY LEARNER Patient   HIGHEST LEVEL OF EDUCATION - PRIMARY LEARNER  GRADUATED HIGH SCHOOL OR GED   BARRIERS PRIMARY LEARNER NONE   CO-LEARNER CAREGIVER No   PRIMARY LANGUAGE ENGLISH   LEARNER PREFERENCE PRIMARY DEMONSTRATION     DEMONSTRATION     -     -   ANSWERED BY Patient   RELATIONSHIP SELF       Travel Screening:   Travel Screening     Question   Response    In the last month, have you been in contact with someone who was confirmed or suspected to have Coronavirus / COVID-19? No / Unsure    Have you had a COVID-19 viral test in the last 14 days? No    Do you have any of the following new or worsening symptoms? None of these    Have you traveled internationally in the last month? No      Travel History   Travel since 01/01/21     No documented travel since 01/01/21          Health Maintenance reviewed and discussed and ordered per Provider. There are no preventive care reminders to display for this patient. .      Coordination of Care:  1. Have you been to the ER, urgent care clinic since your last visit? Hospitalized since your last visit? No     2. Have you seen or consulted any other health care providers outside of the [address] [place] [address] since your last visit? Include any pap smears or colon screening.  No

## 2021-02-01 NOTE — PROGRESS NOTES
Eddie Palma is a 64 y.o. female who was seen by synchronous (real-time) audio-video technology on 2/1/2021 for Blood sugar problem, Hypertension, Cholesterol Problem, and Labs (Completed 12/16/20)        Assessment & Plan:   Diagnoses and all orders for this visit:    1. IFG (impaired fasting glucose)  Much improved. Pt commended. Cont to monitor. 2. Essential hypertension  Recommend routine home blood pressure monitoring. 3. Other hyperlipidemia  Work on diet. 4. Vaccine counseling  Discussed possible options for covid vaccination. Recommend covid vaccination when available. 5. Abnormal finding  Concern for possible raynauds phenomenon. Recommend rheumatology eval.  Pt is no longer in the area. Unclear if we can make referrals for her in a different [facility] region. Pt is near [facility]. Recommend that she consider going to the ER or Urgent Care there for eval if it recurs so that they may refer her within their network for eval.  Pt understands and agrees with plan. The complexity of medical decision making for this visit is moderate   Follow-up and Dispositions    · Return in about 3 months (around 5/1/2021) for elevated fasting blood sugar, high blood pressure, high cholesterol. Pt is working on finding a new pcp in her local area. She will f/u with me in 3 mo if she has not yet had a new pt appt there. 712  Subjective:   Patient contacted via doxy. me. Pt has been feeling well. Recent labs reviewed in detail. Pt reports that about 2 wks ago, both hands were turning blue. One hand felt cold but the other was warm. Pt is now in living in [place]. The weather was pretty average on that day. This has never been an issue for her in the past.    Pt has not checked her bp lately as her belongings are still packed up. She has been staying with her daughter's family in [place] and will be moving there permanently very soon.       Pt does plan to get a covid vaccination but is not sure where she will be able to get one. Prior to Admission medications    Medication Sig Start Date End Date Taking? Authorizing Provider   traZODone (DESYREL) 50 mg tablet Take 1 Tab by mouth nightly as needed for Sleep. 11/9/20   Vinny Triana MD   atorvastatin (LIPITOR) 20 mg tablet TAKE 1 TABLET DAILY 11/2/20   Vinny Triana MD   diclofenac (VOLTAREN) 1 % gel Apply  to affected area four (4) times daily. 12/18/19   Vinny Triana MD   hydroCHLOROthiazide (HYDRODIURIL) 25 mg tablet Take 1 Tab by mouth daily. 12/18/19   Vinny Triana MD   montelukast (SINGULAIR) 10 mg tablet Take 1 Tab by mouth daily. 5/29/19   Vinny Triana MD   cetirizine (ZYRTEC) 10 mg tablet Take  by mouth daily. Provider, Historical   omega-3 fatty acids-vitamin e (FISH OIL) 1,000 mg Cap Take 1 Cap by mouth daily. 1200mg    Provider, Historical     Patient Active Problem List   Diagnosis Code    Essential hypertension I10    Hyperlipidemia E78.5    AR (allergic rhinitis) J30.9    Allergic conjunctivitis H10.10    Irritable bowel syndrome K58.9    Arthritis knees, hips, right shoulder M19.90    Prediabetes R73.03    Hypertriglyceridemia E78.1    Acute non-recurrent maxillary sinusitis J01.00     Current Outpatient Medications   Medication Sig Dispense Refill    traZODone (DESYREL) 50 mg tablet Take 1 Tab by mouth nightly as needed for Sleep. 30 Tab 4    atorvastatin (LIPITOR) 20 mg tablet TAKE 1 TABLET DAILY 90 Tab 3    diclofenac (VOLTAREN) 1 % gel Apply  to affected area four (4) times daily. 300 g 4    hydroCHLOROthiazide (HYDRODIURIL) 25 mg tablet Take 1 Tab by mouth daily. 90 Tab 4    montelukast (SINGULAIR) 10 mg tablet Take 1 Tab by mouth daily. 30 Tab 5    cetirizine (ZYRTEC) 10 mg tablet Take  by mouth daily.  omega-3 fatty acids-vitamin e (FISH OIL) 1,000 mg Cap Take 1 Cap by mouth daily.  1200mg       No Known Allergies  Past Medical History:   Diagnosis Date    Allergic rhinitis     Arthritis     Endometriosis     had hyst after failed ablation    Essential hypertension 2011    Hyperlipidemia     Hypertension     IBS (irritable bowel syndrome)     Migraine      Past Surgical History:   Procedure Laterality Date    HX HYSTERECTOMY      SELINA STEREO BX BREAST RT NDL CORE  2010     Family History   Problem Relation Age of Onset    Other Mother         sepsis    Hypertension Father     High Cholesterol Father     Lung Disease Father         mesothelioma     Social History     Tobacco Use    Smoking status: Former Smoker     Packs/day: 1.50     Years: 5.00     Pack years: 7.50     Types: Cigarettes     Quit date: 1986     Years since quittin.1    Smokeless tobacco: Never Used   Substance Use Topics    Alcohol use: Yes     Comment: moderate       Review of Systems   Constitutional: Negative. HENT: Negative. Respiratory: Negative. Cardiovascular: Negative. Musculoskeletal:        Blue hands   All other systems reviewed and are negative. Objective:     Patient-Reported Vitals 2020   Patient-Reported Systolic  985   Patient-Reported Diastolic 64      General: alert, cooperative, no distress   Mental  status: normal mood, behavior, speech, dress, motor activity, and thought processes, able to follow commands   HENT: NCAT   Neck: no visualized mass   Resp: no respiratory distress   Neuro: no gross deficits   Skin: no discoloration or lesions of concern on visible areas   Psychiatric: normal affect, consistent with stated mood, no evidence of hallucinations     Additional exam findings: none      We discussed the expected course, resolution and complications of the diagnosis(es) in detail. Medication risks, benefits, costs, interactions, and alternatives were discussed as indicated. I advised her to contact the office if her condition worsens, changes or fails to improve as anticipated.  She expressed understanding with the diagnosis(es) and plan. Zhao Montes, who was evaluated through a patient-initiated, synchronous (real-time) audio-video encounter, and/or her healthcare decision maker, is aware that it is a billable service, with coverage as determined by her insurance carrier. She provided verbal consent to proceed: n/a- consent obtained within past 12 months, and patient identification was verified. It was conducted pursuant to the emergency declaration under the [address] [place] [address] authority and the [employer] General Act. A caregiver was present when appropriate. Ability to conduct physical exam was limited. I was in the office. The patient was at home.       Hilda Yancey MD

## 2021-03-08 DIAGNOSIS — I10 ESSENTIAL HYPERTENSION: ICD-10-CM

## 2021-04-30 NOTE — TELEPHONE ENCOUNTER
This refill request was in nurse error pool. Last visit 2/1/21, next appt 5/6/21.  Labs done in Dec.

## 2021-05-03 RX ORDER — HYDROCHLOROTHIAZIDE 25 MG/1
TABLET ORAL
Qty: 90 TAB | Refills: 3 | OUTPATIENT
Start: 2021-05-03

## 2021-05-18 RX ORDER — HYDROCHLOROTHIAZIDE 25 MG/1
25 TABLET ORAL DAILY
Qty: 90 TAB | Refills: 0 | Status: SHIPPED | OUTPATIENT
Start: 2021-05-18